# Patient Record
Sex: MALE | Race: WHITE | Employment: OTHER | URBAN - METROPOLITAN AREA
[De-identification: names, ages, dates, MRNs, and addresses within clinical notes are randomized per-mention and may not be internally consistent; named-entity substitution may affect disease eponyms.]

---

## 2019-12-23 ENCOUNTER — HOSPITAL ENCOUNTER (EMERGENCY)
Age: 49
Discharge: HOME | End: 2019-12-23
Attending: FAMILY MEDICINE
Payer: MEDICAID

## 2019-12-23 VITALS
HEIGHT: 68 IN | TEMPERATURE: 98 F | DIASTOLIC BLOOD PRESSURE: 87 MMHG | RESPIRATION RATE: 16 BRPM | WEIGHT: 179 LBS | HEART RATE: 67 BPM | OXYGEN SATURATION: 97 % | SYSTOLIC BLOOD PRESSURE: 125 MMHG | BODY MASS INDEX: 27.13 KG/M2

## 2019-12-23 DIAGNOSIS — J40 BRONCHITIS: Primary | ICD-10-CM

## 2019-12-23 PROCEDURE — G0381 LEV 2 HOSP TYPE B ED VISIT: HCPCS

## 2019-12-23 RX ORDER — DOXYCYCLINE HYCLATE 100 MG
100 TABLET ORAL 2 TIMES DAILY
Qty: 14 TABLET | Refills: 0 | Status: SHIPPED | OUTPATIENT
Start: 2019-12-23 | End: 2019-12-30

## 2019-12-23 RX ORDER — LEVOTHYROXINE SODIUM 112 UG/1
112 TABLET ORAL DAILY
COMMUNITY
End: 2021-03-22 | Stop reason: SDUPTHER

## 2019-12-23 RX ORDER — ALBUTEROL SULFATE 90 UG/1
2 AEROSOL, METERED RESPIRATORY (INHALATION) EVERY 6 HOURS PRN
COMMUNITY

## 2019-12-23 RX ORDER — MONTELUKAST SODIUM 10 MG/1
10 TABLET ORAL NIGHTLY
COMMUNITY
End: 2021-11-02 | Stop reason: SDUPTHER

## 2019-12-23 RX ORDER — PREDNISONE 20 MG/1
40 TABLET ORAL DAILY
Qty: 10 TABLET | Refills: 0 | Status: SHIPPED | OUTPATIENT
Start: 2019-12-23 | End: 2019-12-28

## 2019-12-23 RX ORDER — BENZONATATE 100 MG/1
100 CAPSULE ORAL 3 TIMES DAILY PRN
Qty: 20 CAPSULE | Refills: 0 | Status: SHIPPED | OUTPATIENT
Start: 2019-12-23 | End: 2019-12-30

## 2020-02-01 ENCOUNTER — HOSPITAL ENCOUNTER (EMERGENCY)
Age: 50
Discharge: HOME | End: 2020-02-01
Attending: FAMILY MEDICINE
Payer: MEDICAID

## 2020-02-01 VITALS
WEIGHT: 179 LBS | HEART RATE: 80 BPM | DIASTOLIC BLOOD PRESSURE: 70 MMHG | TEMPERATURE: 98.2 F | OXYGEN SATURATION: 97 % | BODY MASS INDEX: 27.13 KG/M2 | SYSTOLIC BLOOD PRESSURE: 127 MMHG | HEIGHT: 68 IN | RESPIRATION RATE: 18 BRPM

## 2020-02-01 PROCEDURE — G0381 LEV 2 HOSP TYPE B ED VISIT: HCPCS

## 2020-02-01 RX ORDER — BENZONATATE 100 MG/1
100 CAPSULE ORAL 2 TIMES DAILY PRN
Qty: 30 CAPSULE | Refills: 0 | Status: SHIPPED | OUTPATIENT
Start: 2020-02-01 | End: 2020-02-16

## 2020-02-01 RX ORDER — PREDNISONE 10 MG/1
50 TABLET ORAL DAILY
COMMUNITY
End: 2020-03-04

## 2020-02-01 RX ORDER — BUSPIRONE HYDROCHLORIDE 5 MG/1
5 TABLET ORAL 2 TIMES DAILY
COMMUNITY
End: 2020-12-15

## 2020-02-01 NOTE — ED PROVIDER NOTES
---------------------------------    IMPRESSION  1.  Viral URI        DISPOSITION  Disposition: Discharge to home  Patient condition is stable              Hunter Pro MD  02/01/20 [id number]

## 2020-03-04 ENCOUNTER — HOSPITAL ENCOUNTER (EMERGENCY)
Age: 50
Discharge: HOME | End: 2020-03-04
Attending: EMERGENCY MEDICINE
Payer: MEDICAID

## 2020-03-04 VITALS
WEIGHT: 179 LBS | HEIGHT: 68 IN | BODY MASS INDEX: 27.13 KG/M2 | TEMPERATURE: 98.3 F | SYSTOLIC BLOOD PRESSURE: 138 MMHG | OXYGEN SATURATION: 97 % | RESPIRATION RATE: 16 BRPM | HEART RATE: 70 BPM | DIASTOLIC BLOOD PRESSURE: 66 MMHG

## 2020-03-04 LAB — STREP GRP A PCR: NEGATIVE

## 2020-03-04 PROCEDURE — G0382 LEV 3 HOSP TYPE B ED VISIT: HCPCS

## 2020-03-04 PROCEDURE — 87880 STREP A ASSAY W/OPTIC: CPT

## 2020-03-04 RX ORDER — FLUTICASONE PROPIONATE 220 UG/1
2 AEROSOL, METERED RESPIRATORY (INHALATION) 2 TIMES DAILY
COMMUNITY
End: 2020-12-15 | Stop reason: SDUPTHER

## 2020-03-04 RX ORDER — DOXYCYCLINE HYCLATE 100 MG
100 TABLET ORAL 2 TIMES DAILY
Qty: 20 TABLET | Refills: 0 | Status: SHIPPED | OUTPATIENT
Start: 2020-03-04 | End: 2020-03-14

## 2020-03-04 ASSESSMENT — ENCOUNTER SYMPTOMS
SORE THROAT: 0
COUGH: 0
EYE REDNESS: 0
SHORTNESS OF BREATH: 0
NAUSEA: 0
VOMITING: 0
SINUS PRESSURE: 0
DIARRHEA: 0
ABDOMINAL PAIN: 0
EYE DISCHARGE: 0
WHEEZING: 0
BACK PAIN: 0
EYE PAIN: 0

## 2020-03-04 NOTE — ED PROVIDER NOTES
The history is provided by the patient. Pharyngitis   Location:  Generalized  Quality:  Aching and sore  Severity:  Moderate  Onset quality:  Sudden  Duration:  1 day  Chronicity:  New  Associated symptoms: no abdominal pain, no adenopathy, no chest pain, no chills, no cough, no ear pain, no eye discharge, no fever, no headaches, no rash and no shortness of breath         Review of Systems   Constitutional: Negative for chills and fever. HENT: Negative for ear pain, sinus pressure and sore throat. Eyes: Negative for pain, discharge and redness. Respiratory: Negative for cough, shortness of breath and wheezing. Cardiovascular: Negative for chest pain. Gastrointestinal: Negative for abdominal pain, diarrhea, nausea and vomiting. Genitourinary: Negative for dysuria and frequency. Musculoskeletal: Negative for arthralgias and back pain. Skin: Negative for rash and wound. Neurological: Negative for weakness and headaches. Hematological: Negative for adenopathy. All other systems reviewed and are negative. Physical Exam  Vitals signs and nursing note reviewed. Constitutional:       Appearance: He is well-developed. HENT:      Head: Normocephalic and atraumatic. Jaw: No trismus. Right Ear: Hearing, tympanic membrane, ear canal and external ear normal.      Left Ear: Hearing, tympanic membrane, ear canal and external ear normal.      Nose: Nose normal.      Right Sinus: No maxillary sinus tenderness or frontal sinus tenderness. Left Sinus: No maxillary sinus tenderness or frontal sinus tenderness. Mouth/Throat:      Pharynx: Uvula midline. Oropharyngeal exudate and posterior oropharyngeal erythema present. No uvula swelling. Eyes:      General: Lids are normal.      Conjunctiva/sclera: Conjunctivae normal.      Pupils: Pupils are equal, round, and reactive to light. Neck:      Musculoskeletal: Normal range of motion and neck supple.    Cardiovascular:      Rate and Rhythm: Normal rate and regular rhythm. Heart sounds: Normal heart sounds. No murmur. Pulmonary:      Effort: Pulmonary effort is normal.      Breath sounds: Normal breath sounds. Abdominal:      General: Bowel sounds are normal.      Palpations: Abdomen is soft. Abdomen is not rigid. Tenderness: There is no abdominal tenderness. There is no guarding or rebound. Skin:     General: Skin is warm and dry. Findings: No abrasion or rash. Neurological:      Mental Status: He is alert and oriented to person, place, and time. GCS: GCS eye subscore is 4. GCS verbal subscore is 5. GCS motor subscore is 6. Cranial Nerves: No cranial nerve deficit. Sensory: No sensory deficit. Coordination: Coordination normal.      Gait: Gait normal.          Procedures     MDM          --------------------------------------------- PAST HISTORY ---------------------------------------------  Past Medical History:  has a past medical history of Asthma, COPD (chronic obstructive pulmonary disease) ([facility] [address] Emphysema lung ([facility] [address] and Umbilical hernia. Past Surgical History:  has a past surgical history that includes Appendectomy. Social History:  reports that he has been smoking. He has been smoking about 0.50 packs per day. He has never used smokeless tobacco. He reports current alcohol use. He reports that he does not use drugs. Family History: family history is not on file. The patients home medications have been reviewed.     Allergies: Erythromycin and Penicillins    -------------------------------------------------- RESULTS -------------------------------------------------  Labs:  Results for orders placed or performed during the hospital encounter of 03/04/20   Strep Screen Group A Throat   Result Value Ref Range    Strep Grp A PCR Negative Negative       Radiology:  No orders to display       ------------------------- NURSING NOTES AND VITALS REVIEWED

## 2020-10-08 ENCOUNTER — TELEPHONE (OUTPATIENT)
Dept: ADMINISTRATIVE | Age: 50
End: 2020-10-08

## 2020-10-08 NOTE — TELEPHONE ENCOUNTER
Pt is wanting to establish with you, you see his girlfriend, Dolly Roland (#[id number]). Please advise. Thank you in advance.

## 2020-12-15 ENCOUNTER — OFFICE VISIT (OUTPATIENT)
Dept: FAMILY MEDICINE CLINIC | Age: 50
End: 2020-12-15
Payer: MEDICAID

## 2020-12-15 VITALS
RESPIRATION RATE: 20 BRPM | DIASTOLIC BLOOD PRESSURE: 82 MMHG | BODY MASS INDEX: 25.76 KG/M2 | HEART RATE: 78 BPM | OXYGEN SATURATION: 97 % | SYSTOLIC BLOOD PRESSURE: 128 MMHG | HEIGHT: 68 IN | WEIGHT: 170 LBS

## 2020-12-15 PROCEDURE — G8926 SPIRO NO PERF OR DOC: HCPCS | Performed by: FAMILY MEDICINE

## 2020-12-15 PROCEDURE — 4004F PT TOBACCO SCREEN RCVD TLK: CPT | Performed by: FAMILY MEDICINE

## 2020-12-15 PROCEDURE — 99203 OFFICE O/P NEW LOW 30 MIN: CPT | Performed by: FAMILY MEDICINE

## 2020-12-15 PROCEDURE — G8427 DOCREV CUR MEDS BY ELIG CLIN: HCPCS | Performed by: FAMILY MEDICINE

## 2020-12-15 PROCEDURE — G8419 CALC BMI OUT NRM PARAM NOF/U: HCPCS | Performed by: FAMILY MEDICINE

## 2020-12-15 PROCEDURE — G8484 FLU IMMUNIZE NO ADMIN: HCPCS | Performed by: FAMILY MEDICINE

## 2020-12-15 PROCEDURE — 3023F SPIROM DOC REV: CPT | Performed by: FAMILY MEDICINE

## 2020-12-15 RX ORDER — FLUTICASONE PROPIONATE 110 UG/1
AEROSOL, METERED RESPIRATORY (INHALATION)
COMMUNITY
End: 2020-12-15

## 2020-12-15 RX ORDER — FLUTICASONE PROPIONATE 220 UG/1
2 AEROSOL, METERED RESPIRATORY (INHALATION) 2 TIMES DAILY
COMMUNITY
Start: 2020-02-07

## 2020-12-15 RX ORDER — PREDNISONE 10 MG/1
TABLET ORAL PRN
COMMUNITY
End: 2021-05-03 | Stop reason: SDUPTHER

## 2020-12-15 RX ORDER — ALBUTEROL SULFATE 2.5 MG/3ML
SOLUTION RESPIRATORY (INHALATION)
COMMUNITY
Start: 2020-03-31

## 2020-12-15 ASSESSMENT — PATIENT HEALTH QUESTIONNAIRE - PHQ9
SUM OF ALL RESPONSES TO PHQ QUESTIONS 1-9: 0
1. LITTLE INTEREST OR PLEASURE IN DOING THINGS: 0
SUM OF ALL RESPONSES TO PHQ QUESTIONS 1-9: 0
2. FEELING DOWN, DEPRESSED OR HOPELESS: 0
SUM OF ALL RESPONSES TO PHQ QUESTIONS 1-9: 0
SUM OF ALL RESPONSES TO PHQ9 QUESTIONS 1 & 2: 0

## 2020-12-15 ASSESSMENT — ENCOUNTER SYMPTOMS
SHORTNESS OF BREATH: 1
NAUSEA: 0
DIARRHEA: 0
VOMITING: 0

## 2020-12-15 NOTE — PATIENT INSTRUCTIONS
Patient Education        Umbilical Hernia: Care Instructions  Overview     An umbilical hernia is a bulge near the belly button, or navel. Intestines or other tissues may bulge through an opening or a weak spot in the stomach muscles. The hernia has a sac that may hold some intestine, fat, or fluid. Many umbilical hernias are caused by pressure near the belly button. Pressure may come from increased weight, repeated straining, or pregnancy. A very small hernia may not cause problems. But your doctor may recommend repairing the muscle. This helps you avoid the risk that the hernia might trap some of the tissues or intestine. This could be an emergency. Follow-up care is a key part of your treatment and safety. Be sure to make and go to all appointments, and call your doctor if you are having problems. It's also a good idea to know your test results and keep a list of the medicines you take. How can you care for yourself at home? · Watch for any signs that the hernia may be causing problems. Your belly may get bigger, and the skin over the hernia may look red. You may have pain or feel bulging or pressure from the hernia. Call your doctor right away if you see these signs. When should you call for help? Call your doctor now or seek immediate medical care if:    · You have new or worse belly pain.     · You vomit.     · You cannot pass stool or gas.     · You can't push the hernia back into place with gentle pressure when you are lying down.     · The area over the hernia turns red or becomes tender. Watch closely for changes in your health, and be sure to contact your doctor if you have any problems. Current as of: April 15, 2020               Content Version: 12.6  © 2006-2020 [employer]. Care instructions adapted under license by [facility] ([facility]).  If you have questions about a medical condition or this instruction, always ask your healthcare professional. [address] any warranty or liability for your use of this information.

## 2020-12-15 NOTE — PROGRESS NOTES
Patient is a new patient here to get established. Dr. Tianna KENNY Yulia(:  1970) is a 52 y.o. male, here for     Patient has hernia in umbilicus. Started in March after bad asthma attack. Patient has noticed worsening pain for past week. Has been doing more heavy lifting. Denies nausea/vomiting/bowel trouble. Pain comes and goes, but noticing it protrudes further out. Patient has history of asthma/COPD. Patient sees pulmonologist in [facility]. Patient taking Lavelle Ort, home oxygen, and rarely needs albuterol prn.  +smoker for many years--1 ppd. Patient has history of hypothyroidism. Was managed by PCP. No recent labs. Patient has had intermittent urinary frequency and insomnia when taking Prednisone. Patient takes it as needed if he gets inflammation in his throat. Review of Systems   Eyes: Positive for visual disturbance (blurry vision due to traumatic eye injury). Respiratory: Positive for shortness of breath. Cardiovascular: Negative for chest pain, palpitations and leg swelling. Gastrointestinal: Negative for diarrhea, nausea and vomiting. Genitourinary: Negative for difficulty urinating, dysuria and frequency. Skin: Positive for rash (tinea versicolor). Psychiatric/Behavioral: Negative for dysphoric mood. Prior to Visit Medications    Medication Sig Taking?  Authorizing Provider   predniSONE (DELTASONE) 10 MG tablet as needed  Yes Historical Provider, MD   albuterol (PROVENTIL) (2.5 MG/3ML) 0.083% nebulizer solution inhale contents of 1 vial ( 3 milliliters ) in nebulizer by mouth and INTO THE LUNGS every 6 hours if needed Yes Historical Provider, MD   B Complex Vitamins (VITAMIN B COMPLEX 100 IJ) as directed Yes Historical Provider, MD   fluticasone (FLOVENT HFA) 220 MCG/ACT inhaler Inhale 2 puffs into the lungs 2 times daily Yes Historical Provider, MD   mometasone-formoterol (DULERA) 200-5 MCG/ACT inhaler Inhale 2 puffs into the lungs every 12 hours Yes Historical Provider, MD   levothyroxine (SYNTHROID) 112 MCG tablet Take 112 mcg by mouth Daily Yes Historical Provider, MD   albuterol sulfate  (90 Base) MCG/ACT inhaler Inhale 2 puffs into the lungs every 6 hours as needed for Wheezing Yes Historical Provider, MD   montelukast (SINGULAIR) 10 MG tablet Take 10 mg by mouth nightly Yes Historical Provider, MD        Allergies   Allergen Reactions    Erythromycin Swelling    Penicillins Other (See Comments)       Past Medical History:   Diagnosis Date    Asthma     [facility] ([facility]     Emphysema lung ([facility]     Hypothyroidism     Umbilical hernia        Past Surgical History:   Procedure Laterality Date    APPENDECTOMY         Social History     Socioeconomic History    Marital status:      Spouse name: Not on file    Number of children: Not on file    Years of education: Not on file    Highest education level: Not on file   Occupational History    Not on file   Social Needs    Financial resource strain: Not on file    Food insecurity     Worry: Not on file     Inability: Not on file    Transportation needs     Medical: Not on file     Non-medical: Not on file   Tobacco Use    Smoking status: Current Every Day Smoker     Packs/day: 1.00    Smokeless tobacco: Former User   Substance and Sexual Activity    Alcohol use: Yes     Comment: rarely    Drug use: Never    Sexual activity: Not on file   Lifestyle    Physical activity     Days per week: Not on file     Minutes per session: Not on file    Stress: Not on file   Relationships    Social connections     Talks on phone: Not on file     Gets together: Not on file     Attends Jew service: Not on file     Active member of club or organization: Not on file     Attends meetings of clubs or organizations: Not on file     Relationship status: Not on file    Intimate partner violence     Fear of current or ex partner: Not on file     Emotionally abused: Not on file     Physically abused: Not on file     Forced sexual activity: Not on file   Other Topics Concern    Not on file   Social History Narrative    Not on file        Family History   Problem Relation Age of Onset    Hypothyroidism Mother     Cancer Father         Multiple myeloma    Heart Attack Maternal Grandmother     Heart Failure Maternal Grandmother     Hypothyroidism Sister     Cancer Maternal Grandfather         Leukemia    Heart Disease Maternal Grandfather     Heart Failure Paternal Grandmother     Heart Attack Paternal Grandfather        Vitals:    12/15/20 1521   BP: 128/82   Pulse: 78   Resp: 20   SpO2: 97%   Weight: 170 lb (77.1 kg)   Height: 5' 8\" (1.727 m)     Estimated body mass index is 25.85 kg/m² as calculated from the following:    Height as of this encounter: 5' 8\" (1.727 m). Weight as of this encounter: 170 lb (77.1 kg). Physical Exam  Vitals signs reviewed. Constitutional:       Appearance: He is well-developed. Cardiovascular:      Rate and Rhythm: Normal rate and regular rhythm. Heart sounds: Normal heart sounds. No murmur. No friction rub. Pulmonary:      Effort: Pulmonary effort is normal. No respiratory distress. Breath sounds: Normal breath sounds. No wheezing or rales. Abdominal:      General: Bowel sounds are normal. There is no distension. Palpations: Abdomen is soft. Tenderness: There is no abdominal tenderness. There is no guarding or rebound. Hernia: A hernia (small umbilical) is present. Skin:     General: Skin is warm and dry. Neurological:      Mental Status: He is alert and oriented to person, place, and time. ASSESSMENT/PLAN:  Chiquis Don was seen today for hernia. Diagnoses and all orders for this visit:    Umbilical hernia without obstruction and without gangrene  -     Comprehensive Metabolic Panel; Future  -     CBC;  Future  -     Demar Soares MD, General Surgery, [facility] hypothyroidism  -     Lipid Panel; Future  -     TSH without Reflex; Future    Asthma with COPD ([facility] [address]        -     Continue respiratory regimen        Return in about 3 months (around 3/15/2021) for thyroid, 30 minute slot please.     Diann Torres

## 2020-12-16 ENCOUNTER — INITIAL CONSULT (OUTPATIENT)
Dept: SURGERY | Age: 50
End: 2020-12-16
Payer: MEDICAID

## 2020-12-16 ENCOUNTER — TELEPHONE (OUTPATIENT)
Dept: SURGERY | Age: 50
End: 2020-12-16

## 2020-12-16 VITALS
DIASTOLIC BLOOD PRESSURE: 73 MMHG | HEART RATE: 81 BPM | BODY MASS INDEX: 25.76 KG/M2 | SYSTOLIC BLOOD PRESSURE: 128 MMHG | TEMPERATURE: 99 F | WEIGHT: 170 LBS | HEIGHT: 68 IN

## 2020-12-16 PROBLEM — K42.9 UMBILICAL HERNIA WITHOUT OBSTRUCTION AND WITHOUT GANGRENE: Status: ACTIVE | Noted: 2020-12-16

## 2020-12-16 PROCEDURE — G8427 DOCREV CUR MEDS BY ELIG CLIN: HCPCS | Performed by: SURGERY

## 2020-12-16 PROCEDURE — G8484 FLU IMMUNIZE NO ADMIN: HCPCS | Performed by: SURGERY

## 2020-12-16 PROCEDURE — G8419 CALC BMI OUT NRM PARAM NOF/U: HCPCS | Performed by: SURGERY

## 2020-12-16 PROCEDURE — 99204 OFFICE O/P NEW MOD 45 MIN: CPT | Performed by: SURGERY

## 2020-12-16 PROCEDURE — 4004F PT TOBACCO SCREEN RCVD TLK: CPT | Performed by: SURGERY

## 2020-12-16 NOTE — PROGRESS NOTES
General Surgery History and Physical  [facility]    Patient's Name/Date of Birth: Alvarez Velazquez / 1970    Date: December 16, 2020     Surgeon: Michi Singh M.D.    PCP: Alicia Mcfadden MD     Chief Complaint: Abdominal hernia    HPI:   Alvarez Velazquez is a 52 y.o. male who presents for evaluation of umbilical hernia. Timing is constant, radiation to midline, alleviated by none and started several months ago, Feb and severity is 7/10. he has history of multiple abdominal surgeries. he has no history of previous repairs of the hernia. Denies nausea, vomiting, fever, chills, SOB, chest pain, diarrheal constipation. No history of abnormal colonoscopy. They are a smoker, 1pack per day for 30 years    Patient Active Problem List   Diagnosis    Umbilical hernia without obstruction and without gangrene       Past Medical History:   Diagnosis Date    Asthma     [facility]ch[facility]) [facility] [address]     Emphysema lung ([facility] [address]     Hypothyroidism     Umbilical hernia        Past Surgical History:   Procedure Laterality Date    APPENDECTOMY         Allergies   Allergen Reactions    Erythromycin Swelling    Penicillins Other (See Comments)       The patient has a family history that is negative for severe cardiovascular or respiratory issues, negative for reaction to anesthesia. Time spent reviewing past medical, surgical, social and family history, vitals, nursing assessment and images. No changes from above documented history.     Social History     Socioeconomic History    Marital status:      Spouse name: Not on file    Number of children: Not on file    Years of education: Not on file    Highest education level: Not on file   Occupational History    Not on file   Social Needs    Financial resource strain: Not on file    Food insecurity     Worry: Not on file     Inability: Not on file    Transportation needs     Medical: Not on file Non-medical: Not on file   Tobacco Use    Smoking status: Current Every Day Smoker     Packs/day: 1.00    Smokeless tobacco: Former User   Substance and Sexual Activity    Alcohol use: Yes     Comment: rarely    Drug use: Never    Sexual activity: Not on file   Lifestyle    Physical activity     Days per week: Not on file     Minutes per session: Not on file    Stress: Not on file   Relationships    Social connections     Talks on phone: Not on file     Gets together: Not on file     Attends Jain service: Not on file     Active member of club or organization: Not on file     Attends meetings of clubs or organizations: Not on file     Relationship status: Not on file    Intimate partner violence     Fear of current or ex partner: Not on file     Emotionally abused: Not on file     Physically abused: Not on file     Forced sexual activity: Not on file   Other Topics Concern    Not on file   Social History Narrative    Not on file       A complete 10 system review was performed and are otherwise negative unless mentioned in the above HPI. Specific negatives are listed below but may not include all those reviewed.     General ROS: negative obtundation, AMS  ENT ROS: negative rhinorrhea, epistaxis  Allergy and Immunology ROS: negative itchy/watery eyes or nasal congestion  Hematological and Lymphatic ROS: negative spontaneous bleeding or bruising  Endocrine ROS: negative  lethargy, mood swings, palpitations or polydipsia/polyuria  Respiratory ROS: negative sputum changes, stridor, tachypnea or wheezing  Cardiovascular ROS: negative for - loss of consciousness, murmur or orthopnea  Gastrointestinal ROS: negative for - hematochezia or hematemesis  Genito-Urinary ROS: negative for -  genital discharge or hematuria  Musculoskeletal ROS: negative for - focal weakness, gangrene  Psych/Neuro ROS: negative for - visual or auditory hallucinations, suicidal ideation    Physical exam: /73   Pulse 81   Temp 99 °F (37.2 °C) (Oral)   Ht 5' 8\" (1.727 m)   Wt 170 lb (77.1 kg)   BMI 25.85 kg/m²   General appearance:  NAD, appears stated age  Head: NCAT, PERRLA, EOMI, red conjunctiva  Neck: supple, no masses, trachea midline  Lungs: Equal chest rise bilateral, no retractions, no wheezing  Heart: Reg rate  Abdomen: soft, reducible umbilical hernia, mild tender  Skin; warm and dry, no cyanosis  Gu: no cva tenderness  Extremities: atraumatic, no focal motor deficits, no open wounds  Psych: No tremor, visual hallucinations        Radiology: I reviewed relevant abdominal imaging from this admission and that available in the EMR    Assessment:  Alvarez Velazquez is a 52 y.o. male with incisional hernia  Patient Active Problem List   Diagnosis    Umbilical hernia without obstruction and without gangrene         Plan:  OR for Laparoscopic poss open umbilical hernia repair with mesh  Discussed the risk, benefits and alternatives of surgery including wound infections, bleeding, scar, seroma, mesh infection, mesh removal and migration and recurrent hernia formation and the risks of general anesthetic including MI, CVA, sudden death or reactions to anesthetic medications. The patient understands the risks and alternatives and the possibility of converting to an open procedure. All questions were answered to the patient's satisfaction and they freely signed the consent.          Michi Singh MD  11:41 AM  12/16/2020

## 2020-12-16 NOTE — TELEPHONE ENCOUNTER
Per Dr. Reanna Barber, patient is scheduled for Laparoscopic possible open umbilical hernia repair with mesh  at [facility] on 12/22/20. Surgery scheduling form faxed with confirmation, surgeon's calendar updated. Dr. Reanna Barber to enter orders. Follow up appointment scheduled.    Electronically signed by Fernando Belcher RN on 12/16/2020 at 11:52 AM

## 2020-12-17 ENCOUNTER — HOSPITAL ENCOUNTER (OUTPATIENT)
Age: 50
Discharge: HOME | End: 2020-12-19
Payer: MEDICAID

## 2020-12-17 DIAGNOSIS — E03.9 ACQUIRED HYPOTHYROIDISM: ICD-10-CM

## 2020-12-17 DIAGNOSIS — K42.9 UMBILICAL HERNIA WITHOUT OBSTRUCTION AND WITHOUT GANGRENE: ICD-10-CM

## 2020-12-17 LAB
ALBUMIN SERPL-MCNC: 4.4 G/DL (ref 3.5–5.2)
ALP BLD-CCNC: 79 U/L (ref 40–129)
ALT SERPL-CCNC: 18 U/L (ref 0–40)
ANION GAP SERPL CALCULATED.3IONS-SCNC: 14 MMOL/L (ref 7–16)
AST SERPL-CCNC: 15 U/L (ref 0–39)
BILIRUB SERPL-MCNC: 0.7 MG/DL (ref 0–1.2)
BUN BLDV-MCNC: 16 MG/DL (ref 6–20)
CALCIUM SERPL-MCNC: 9.8 MG/DL (ref 8.6–10.2)
CHLORIDE BLD-SCNC: 103 MMOL/L (ref 98–107)
CHOLESTEROL, TOTAL: 205 MG/DL (ref 0–199)
CO2: 26 MMOL/L (ref 22–29)
CREAT SERPL-MCNC: 1.1 MG/DL (ref 0.7–1.2)
GFR AFRICAN AMERICAN: >60
GFR NON-AFRICAN AMERICAN: >60 ML/MIN/1.73
GLUCOSE BLD-MCNC: 84 MG/DL (ref 74–99)
HCT VFR BLD CALC: 48.5 % (ref 37–54)
HDLC SERPL-MCNC: 38 MG/DL
HEMOGLOBIN: 15.8 G/DL (ref 12.5–16.5)
LDL CHOLESTEROL CALCULATED: 152 MG/DL (ref 0–99)
MCH RBC QN AUTO: 29.5 PG (ref 26–35)
MCHC RBC AUTO-ENTMCNC: 32.6 % (ref 32–34.5)
MCV RBC AUTO: 90.5 FL (ref 80–99.9)
PDW BLD-RTO: 13.3 FL (ref 11.5–15)
PLATELET # BLD: 255 E9/L (ref 130–450)
PMV BLD AUTO: 10.3 FL (ref 7–12)
POTASSIUM SERPL-SCNC: 5.1 MMOL/L (ref 3.5–5)
RBC # BLD: 5.36 E12/L (ref 3.8–5.8)
SODIUM BLD-SCNC: 143 MMOL/L (ref 132–146)
TOTAL PROTEIN: 7.3 G/DL (ref 6.4–8.3)
TRIGL SERPL-MCNC: 75 MG/DL (ref 0–149)
TSH SERPL DL<=0.05 MIU/L-ACNC: 7.94 UIU/ML (ref 0.27–4.2)
VLDLC SERPL CALC-MCNC: 15 MG/DL
WBC # BLD: 8.8 E9/L (ref 4.5–11.5)

## 2020-12-17 PROCEDURE — U0003 INFECTIOUS AGENT DETECTION BY NUCLEIC ACID (DNA OR RNA); SEVERE ACUTE RESPIRATORY SYNDROME CORONAVIRUS 2 (SARS-COV-2) (CORONAVIRUS DISEASE [COVID-19]), AMPLIFIED PROBE TECHNIQUE, MAKING USE OF HIGH THROUGHPUT TECHNOLOGIES AS DESCRIBED BY CMS-2020-01-R: HCPCS

## 2020-12-18 PROBLEM — E03.9 ACQUIRED HYPOTHYROIDISM: Status: ACTIVE | Noted: 2020-12-18

## 2020-12-18 PROBLEM — J44.9 ASTHMA WITH COPD (HCC): Status: ACTIVE | Noted: 2020-12-18

## 2020-12-18 PROBLEM — J44.89 ASTHMA WITH COPD: Status: ACTIVE | Noted: 2020-12-18

## 2020-12-19 LAB
SARS-COV-2: NOT DETECTED
SOURCE: NORMAL

## 2020-12-21 NOTE — PROGRESS NOTES
[address] [facility]                                                                                                                    PRE OP INSTRUCTIONS FOR  [place]        Date: 12/21/2020    Date of surgery:  12/22/2020   Arrival Time: Hospital will call you between 5pm and 7pm with your final arrival time for surgery    1. Do not eat or drink anything after midnight   prior to surgery. This includes no water, chewing gum, mints or ice chips. 2. Take the following medications with a small sip of water on the morning of Surgery:  Use inhalers nebulizer  and bring  Take  thyroid dos with sip water    3. Diabetics may take evening dose of insulin but none after midnight. If you feel symptomatic or low blood sugar morning of surgery drink 1-2 ounces of apple juice only. 4. Aspirin, Ibuprofen, Advil, Naproxen, Vitamin E and other Anti-inflammatory products should be stopped  before surgery  as directed by your physician. Take Tylenol only unless instructed otherwise by your surgeon. 5. Check with your Doctor regarding stopping Plavix, Coumadin, Lovenox, Eliquis, Effient, or other blood thinners. 6. Do not smoke,use illicit drugs and do not drink any alcoholic beverages 24 hours prior to surgery. 7. You may brush your teeth the morning of surgery. DO NOT SWALLOW WATER    8. You MUST make arrangements for a responsible adult to take you home after your surgery. You will not be allowed to leave alone or drive yourself home. It is strongly suggested someone stay with you the first 24 hrs. Your surgery will be cancelled if you do not have a ride home. 9. PEDIATRIC PATIENTS ONLY:  A parent/legal guardian must accompany a child scheduled for surgery and plan to stay at the hospital until the child is discharged. Please do not bring other children with you.     10. Please wear simple, loose fitting clothing to the hospital.  Do not bring valuables (money, credit cards, checkbooks, etc.) Do not wear any makeup (including no eye makeup) or nail polish on your fingers or toes. 11. DO NOT wear any jewelry or piercings on day of surgery. All body piercing jewelry must be removed. 12. Shower the night before surgery with __x_Antibacterial soap /ANGI WIPES________    13. TOTAL JOINT REPLACEMENT/HYSTERECTOMY PATIENTS ONLY---Remember to bring Blood Bank bracelet to the hospital on the day of surgery. 14. If you have a Living Will and Durable Power of  for Healthcare, please bring in a copy. 15. If appropriate bring crutches, inspirex, WALKER, CANE etc... 12. Notify your Surgeon if you develop any illness between now and surgery time, cough, cold, fever, sore throat, nausea, vomiting, etc.  Please notify your surgeon if you experience dizziness, shortness of breath or blurred vision between now & the time of your surgery. 17. If you have ___dentures, they will be removed before going to the OR; we will provide you a container. If you wear ___contact lenses or ___glasses, they will be removed; please bring a case for them. 18. To provide excellent care visitors will be limited to 2 in the room at any given time. 19. Please bring picture ID and insurance card. 20. Sleep apnea patients need to bring CPAP AND SETTINGS to hospital on day of surgery. 21. During flu season no children under the age of 15 are permitted in the hospital for the safety of all patients. 22. Other                  Please call AMBULATORY CARE if you have any further questions.    [facility]     [address]

## 2020-12-22 ENCOUNTER — ANESTHESIA (OUTPATIENT)
Dept: OPERATING ROOM | Age: 50
End: 2020-12-22
Payer: MEDICAID

## 2020-12-22 ENCOUNTER — HOSPITAL ENCOUNTER (OUTPATIENT)
Dept: GENERAL RADIOLOGY | Age: 50
Setting detail: OUTPATIENT SURGERY
Discharge: HOME | End: 2020-12-24
Attending: SURGERY
Payer: MEDICAID

## 2020-12-22 ENCOUNTER — HOSPITAL ENCOUNTER (OUTPATIENT)
Age: 50
Setting detail: OUTPATIENT SURGERY
Discharge: HOME | End: 2020-12-22
Attending: SURGERY | Admitting: SURGERY
Payer: MEDICAID

## 2020-12-22 ENCOUNTER — ANESTHESIA EVENT (OUTPATIENT)
Dept: OPERATING ROOM | Age: 50
End: 2020-12-22
Payer: MEDICAID

## 2020-12-22 VITALS
OXYGEN SATURATION: 98 % | RESPIRATION RATE: 10 BRPM | SYSTOLIC BLOOD PRESSURE: 113 MMHG | DIASTOLIC BLOOD PRESSURE: 60 MMHG

## 2020-12-22 VITALS
WEIGHT: 170 LBS | HEART RATE: 63 BPM | BODY MASS INDEX: 25.76 KG/M2 | SYSTOLIC BLOOD PRESSURE: 119 MMHG | DIASTOLIC BLOOD PRESSURE: 75 MMHG | TEMPERATURE: 97.1 F | HEIGHT: 68 IN | OXYGEN SATURATION: 92 % | RESPIRATION RATE: 16 BRPM

## 2020-12-22 LAB — METER GLUCOSE: 106 MG/DL (ref 74–99)

## 2020-12-22 PROCEDURE — 7100000010 HC PHASE II RECOVERY - FIRST 15 MIN: Performed by: SURGERY

## 2020-12-22 PROCEDURE — 71046 X-RAY EXAM CHEST 2 VIEWS: CPT

## 2020-12-22 PROCEDURE — 88302 TISSUE EXAM BY PATHOLOGIST: CPT

## 2020-12-22 PROCEDURE — 49585 REPAIR UMBILICAL HERN,5+Y/O,REDUC: CPT | Performed by: SURGERY

## 2020-12-22 PROCEDURE — 2709999900 HC NON-CHARGEABLE SUPPLY: Performed by: SURGERY

## 2020-12-22 PROCEDURE — 6360000002 HC RX W HCPCS: Performed by: ANESTHESIOLOGY

## 2020-12-22 PROCEDURE — 2500000003 HC RX 250 WO HCPCS: Performed by: NURSE ANESTHETIST, CERTIFIED REGISTERED

## 2020-12-22 PROCEDURE — 7100000001 HC PACU RECOVERY - ADDTL 15 MIN: Performed by: SURGERY

## 2020-12-22 PROCEDURE — 2580000003 HC RX 258: Performed by: SURGERY

## 2020-12-22 PROCEDURE — 3700000001 HC ADD 15 MINUTES (ANESTHESIA): Performed by: SURGERY

## 2020-12-22 PROCEDURE — 7100000011 HC PHASE II RECOVERY - ADDTL 15 MIN: Performed by: SURGERY

## 2020-12-22 PROCEDURE — 3700000000 HC ANESTHESIA ATTENDED CARE: Performed by: SURGERY

## 2020-12-22 PROCEDURE — 6360000002 HC RX W HCPCS: Performed by: NURSE ANESTHETIST, CERTIFIED REGISTERED

## 2020-12-22 PROCEDURE — 2500000003 HC RX 250 WO HCPCS: Performed by: SURGERY

## 2020-12-22 PROCEDURE — 82962 GLUCOSE BLOOD TEST: CPT

## 2020-12-22 PROCEDURE — 7100000000 HC PACU RECOVERY - FIRST 15 MIN: Performed by: SURGERY

## 2020-12-22 PROCEDURE — 6360000002 HC RX W HCPCS: Performed by: SURGERY

## 2020-12-22 PROCEDURE — 3600000004 HC SURGERY LEVEL 4 BASE: Performed by: SURGERY

## 2020-12-22 PROCEDURE — 3600000014 HC SURGERY LEVEL 4 ADDTL 15MIN: Performed by: SURGERY

## 2020-12-22 RX ORDER — IBUPROFEN 800 MG/1
800 TABLET ORAL EVERY 6 HOURS PRN
Qty: 90 TABLET | Refills: 1 | Status: SHIPPED | OUTPATIENT
Start: 2020-12-22 | End: 2021-02-01

## 2020-12-22 RX ORDER — SODIUM CHLORIDE 0.9 % (FLUSH) 0.9 %
10 SYRINGE (ML) INJECTION PRN
Status: DISCONTINUED | OUTPATIENT
Start: 2020-12-22 | End: 2020-12-22 | Stop reason: HOSPADM

## 2020-12-22 RX ORDER — ONDANSETRON 4 MG/1
4 TABLET, ORALLY DISINTEGRATING ORAL EVERY 8 HOURS PRN
Qty: 20 TABLET | Refills: 1 | Status: SHIPPED | OUTPATIENT
Start: 2020-12-22 | End: 2021-09-21 | Stop reason: ALTCHOICE

## 2020-12-22 RX ORDER — MIDAZOLAM HYDROCHLORIDE 1 MG/ML
INJECTION INTRAMUSCULAR; INTRAVENOUS PRN
Status: DISCONTINUED | OUTPATIENT
Start: 2020-12-22 | End: 2020-12-22 | Stop reason: SDUPTHER

## 2020-12-22 RX ORDER — ROCURONIUM BROMIDE 10 MG/ML
INJECTION, SOLUTION INTRAVENOUS PRN
Status: DISCONTINUED | OUTPATIENT
Start: 2020-12-22 | End: 2020-12-22 | Stop reason: SDUPTHER

## 2020-12-22 RX ORDER — CEFAZOLIN SODIUM 2 G/50ML
SOLUTION INTRAVENOUS
Status: COMPLETED
Start: 2020-12-22 | End: 2020-12-22

## 2020-12-22 RX ORDER — CEFAZOLIN SODIUM 2 G/50ML
2 SOLUTION INTRAVENOUS
Status: COMPLETED | OUTPATIENT
Start: 2020-12-22 | End: 2020-12-22

## 2020-12-22 RX ORDER — ONDANSETRON 2 MG/ML
INJECTION INTRAMUSCULAR; INTRAVENOUS PRN
Status: DISCONTINUED | OUTPATIENT
Start: 2020-12-22 | End: 2020-12-22 | Stop reason: SDUPTHER

## 2020-12-22 RX ORDER — OXYCODONE HYDROCHLORIDE AND ACETAMINOPHEN 5; 325 MG/1; MG/1
1 TABLET ORAL EVERY 6 HOURS PRN
Qty: 18 TABLET | Refills: 0 | Status: SHIPPED | OUTPATIENT
Start: 2020-12-22 | End: 2020-12-27

## 2020-12-22 RX ORDER — NEOSTIGMINE METHYLSULFATE 1 MG/ML
INJECTION, SOLUTION INTRAVENOUS PRN
Status: DISCONTINUED | OUTPATIENT
Start: 2020-12-22 | End: 2020-12-22 | Stop reason: SDUPTHER

## 2020-12-22 RX ORDER — HYDRALAZINE HYDROCHLORIDE 20 MG/ML
5 INJECTION INTRAMUSCULAR; INTRAVENOUS EVERY 10 MIN PRN
Status: DISCONTINUED | OUTPATIENT
Start: 2020-12-22 | End: 2020-12-22 | Stop reason: HOSPADM

## 2020-12-22 RX ORDER — DEXAMETHASONE SODIUM PHOSPHATE 10 MG/ML
INJECTION, SOLUTION INTRAMUSCULAR; INTRAVENOUS PRN
Status: DISCONTINUED | OUTPATIENT
Start: 2020-12-22 | End: 2020-12-22 | Stop reason: SDUPTHER

## 2020-12-22 RX ORDER — LIDOCAINE HYDROCHLORIDE 20 MG/ML
INJECTION, SOLUTION INFILTRATION; PERINEURAL PRN
Status: DISCONTINUED | OUTPATIENT
Start: 2020-12-22 | End: 2020-12-22 | Stop reason: SDUPTHER

## 2020-12-22 RX ORDER — PROPOFOL 10 MG/ML
INJECTION, EMULSION INTRAVENOUS PRN
Status: DISCONTINUED | OUTPATIENT
Start: 2020-12-22 | End: 2020-12-22 | Stop reason: SDUPTHER

## 2020-12-22 RX ORDER — ONDANSETRON 2 MG/ML
4 INJECTION INTRAMUSCULAR; INTRAVENOUS
Status: DISCONTINUED | OUTPATIENT
Start: 2020-12-22 | End: 2020-12-22 | Stop reason: HOSPADM

## 2020-12-22 RX ORDER — PROMETHAZINE HYDROCHLORIDE 25 MG/ML
6.25 INJECTION, SOLUTION INTRAMUSCULAR; INTRAVENOUS
Status: DISCONTINUED | OUTPATIENT
Start: 2020-12-22 | End: 2020-12-22 | Stop reason: HOSPADM

## 2020-12-22 RX ORDER — SODIUM CHLORIDE 9 MG/ML
INJECTION, SOLUTION INTRAVENOUS CONTINUOUS
Status: DISCONTINUED | OUTPATIENT
Start: 2020-12-22 | End: 2020-12-22 | Stop reason: HOSPADM

## 2020-12-22 RX ORDER — SODIUM CHLORIDE, SODIUM LACTATE, POTASSIUM CHLORIDE, CALCIUM CHLORIDE 600; 310; 30; 20 MG/100ML; MG/100ML; MG/100ML; MG/100ML
INJECTION, SOLUTION INTRAVENOUS CONTINUOUS
Status: DISCONTINUED | OUTPATIENT
Start: 2020-12-22 | End: 2020-12-22 | Stop reason: HOSPADM

## 2020-12-22 RX ORDER — LABETALOL HYDROCHLORIDE 5 MG/ML
5 INJECTION, SOLUTION INTRAVENOUS EVERY 10 MIN PRN
Status: DISCONTINUED | OUTPATIENT
Start: 2020-12-22 | End: 2020-12-22 | Stop reason: HOSPADM

## 2020-12-22 RX ORDER — GLYCOPYRROLATE 1 MG/5 ML
SYRINGE (ML) INTRAVENOUS PRN
Status: DISCONTINUED | OUTPATIENT
Start: 2020-12-22 | End: 2020-12-22 | Stop reason: SDUPTHER

## 2020-12-22 RX ORDER — MEPERIDINE HYDROCHLORIDE 25 MG/ML
12.5 INJECTION INTRAMUSCULAR; INTRAVENOUS; SUBCUTANEOUS EVERY 5 MIN PRN
Status: DISCONTINUED | OUTPATIENT
Start: 2020-12-22 | End: 2020-12-22 | Stop reason: HOSPADM

## 2020-12-22 RX ORDER — BUPIVACAINE HYDROCHLORIDE AND EPINEPHRINE 2.5; 5 MG/ML; UG/ML
INJECTION, SOLUTION EPIDURAL; INFILTRATION; INTRACAUDAL; PERINEURAL PRN
Status: DISCONTINUED | OUTPATIENT
Start: 2020-12-22 | End: 2020-12-22 | Stop reason: ALTCHOICE

## 2020-12-22 RX ORDER — SODIUM CHLORIDE 0.9 % (FLUSH) 0.9 %
10 SYRINGE (ML) INJECTION EVERY 12 HOURS SCHEDULED
Status: DISCONTINUED | OUTPATIENT
Start: 2020-12-22 | End: 2020-12-22 | Stop reason: HOSPADM

## 2020-12-22 RX ORDER — DOCUSATE SODIUM 100 MG/1
100 CAPSULE, LIQUID FILLED ORAL 2 TIMES DAILY
Qty: 30 CAPSULE | Refills: 0 | Status: SHIPPED | OUTPATIENT
Start: 2020-12-22 | End: 2021-01-06

## 2020-12-22 RX ORDER — FENTANYL CITRATE 50 UG/ML
INJECTION, SOLUTION INTRAMUSCULAR; INTRAVENOUS PRN
Status: DISCONTINUED | OUTPATIENT
Start: 2020-12-22 | End: 2020-12-22 | Stop reason: SDUPTHER

## 2020-12-22 RX ADMIN — PROPOFOL 180 MG: 10 INJECTION, EMULSION INTRAVENOUS at 08:40

## 2020-12-22 RX ADMIN — LIDOCAINE HYDROCHLORIDE 80 MG: 20 INJECTION, SOLUTION INFILTRATION; PERINEURAL at 08:40

## 2020-12-22 RX ADMIN — Medication 3 MG: at 09:00

## 2020-12-22 RX ADMIN — HYDROCORTISONE SODIUM SUCCINATE 40 MG: 100 INJECTION, POWDER, FOR SOLUTION INTRAMUSCULAR; INTRAVENOUS at 08:07

## 2020-12-22 RX ADMIN — SODIUM CHLORIDE: 9 INJECTION, SOLUTION INTRAVENOUS at 10:06

## 2020-12-22 RX ADMIN — FENTANYL CITRATE 100 MCG: 50 INJECTION, SOLUTION INTRAMUSCULAR; INTRAVENOUS at 08:40

## 2020-12-22 RX ADMIN — ROCURONIUM BROMIDE 30 MG: 10 INJECTION, SOLUTION INTRAVENOUS at 08:40

## 2020-12-22 RX ADMIN — DEXAMETHASONE SODIUM PHOSPHATE 10 MG: 10 INJECTION, SOLUTION INTRAMUSCULAR; INTRAVENOUS at 08:44

## 2020-12-22 RX ADMIN — ONDANSETRON 4 MG: 2 INJECTION INTRAMUSCULAR; INTRAVENOUS at 08:48

## 2020-12-22 RX ADMIN — SODIUM CHLORIDE: 9 INJECTION, SOLUTION INTRAVENOUS at 08:00

## 2020-12-22 RX ADMIN — Medication 0.6 MG: at 09:00

## 2020-12-22 RX ADMIN — MIDAZOLAM 2 MG: 1 INJECTION INTRAMUSCULAR; INTRAVENOUS at 08:36

## 2020-12-22 RX ADMIN — SODIUM CHLORIDE: 9 INJECTION, SOLUTION INTRAVENOUS at 08:36

## 2020-12-22 RX ADMIN — CEFAZOLIN SODIUM 2 G: 2 SOLUTION INTRAVENOUS at 08:36

## 2020-12-22 ASSESSMENT — PULMONARY FUNCTION TESTS
PIF_VALUE: 0
PIF_VALUE: 0
PIF_VALUE: 21
PIF_VALUE: 24
PIF_VALUE: 2
PIF_VALUE: 1
PIF_VALUE: 2
PIF_VALUE: 20
PIF_VALUE: 24
PIF_VALUE: 21
PIF_VALUE: 19
PIF_VALUE: 24
PIF_VALUE: 15
PIF_VALUE: 20
PIF_VALUE: 23
PIF_VALUE: 17
PIF_VALUE: 32
PIF_VALUE: 24
PIF_VALUE: 21
PIF_VALUE: 24
PIF_VALUE: 19
PIF_VALUE: 41
PIF_VALUE: 21
PIF_VALUE: 21
PIF_VALUE: 23
PIF_VALUE: 5
PIF_VALUE: 3
PIF_VALUE: 6
PIF_VALUE: 0

## 2020-12-22 ASSESSMENT — PAIN SCALES - GENERAL
PAINLEVEL_OUTOF10: 1
PAINLEVEL_OUTOF10: 0
PAINLEVEL_OUTOF10: 2
PAINLEVEL_OUTOF10: 0

## 2020-12-22 ASSESSMENT — PAIN DESCRIPTION - DESCRIPTORS
DESCRIPTORS: ACHING
DESCRIPTORS: ACHING

## 2020-12-22 ASSESSMENT — PAIN DESCRIPTION - LOCATION
LOCATION: ABDOMEN
LOCATION: ABDOMEN

## 2020-12-22 ASSESSMENT — PAIN DESCRIPTION - PAIN TYPE
TYPE: ACUTE PAIN;SURGICAL PAIN
TYPE: ACUTE PAIN;SURGICAL PAIN

## 2020-12-22 ASSESSMENT — PAIN DESCRIPTION - ORIENTATION
ORIENTATION: MID
ORIENTATION: MID

## 2020-12-22 ASSESSMENT — PAIN - FUNCTIONAL ASSESSMENT: PAIN_FUNCTIONAL_ASSESSMENT: 0-10

## 2020-12-22 NOTE — H&P
History    Not on file   Social Needs    Financial resource strain: Not on file    Food insecurity     Worry: Not on file     Inability: Not on file    Transportation needs     Medical: Not on file     Non-medical: Not on file   Tobacco Use    Smoking status: Current Every Day Smoker     Packs/day: 1.00    Smokeless tobacco: Current User   Substance and Sexual Activity    Alcohol use: Yes     Comment: rarely    Drug use: Never    Sexual activity: Not on file   Lifestyle    Physical activity     Days per week: Not on file     Minutes per session: Not on file    Stress: Not on file   Relationships    Social connections     Talks on phone: Not on file     Gets together: Not on file     Attends Christian service: Not on file     Active member of club or organization: Not on file     Attends meetings of clubs or organizations: Not on file     Relationship status: Not on file    Intimate partner violence     Fear of current or ex partner: Not on file     Emotionally abused: Not on file     Physically abused: Not on file     Forced sexual activity: Not on file   Other Topics Concern    Not on file   Social History Narrative    Not on file       A complete 10 system review was performed and are otherwise negative unless mentioned in the above HPI. Specific negatives are listed below but may not include all those reviewed.     General ROS: negative obtundation, AMS  ENT ROS: negative rhinorrhea, epistaxis  Allergy and Immunology ROS: negative itchy/watery eyes or nasal congestion  Hematological and Lymphatic ROS: negative spontaneous bleeding or bruising  Endocrine ROS: negative  lethargy, mood swings, palpitations or polydipsia/polyuria  Respiratory ROS: negative sputum changes, stridor, tachypnea or wheezing  Cardiovascular ROS: negative for - loss of consciousness, murmur or orthopnea  Gastrointestinal ROS: negative for - hematochezia or hematemesis  Genito-Urinary ROS: negative for -  genital discharge or hematuria  Musculoskeletal ROS: negative for - focal weakness, gangrene  Psych/Neuro ROS: negative for - visual or auditory hallucinations, suicidal ideation    Physical exam:   /70   Pulse 62   Temp 97.7 °F (36.5 °C) (Infrared)   Resp 16   Ht 5' 8\" (1.727 m)   Wt 170 lb (77.1 kg)   SpO2 94%   BMI 25.85 kg/m²   General appearance:  NAD, appears stated age  Head: NCAT, PERRLA, EOMI, red conjunctiva  Neck: supple, no masses, trachea midline  Lungs: Equal chest rise bilateral, no retractions, no wheezing  Heart: Reg rate  Abdomen: soft, reducible umbilical hernia, mild tender  Skin; warm and dry, no cyanosis  Gu: no cva tenderness  Extremities: atraumatic, no focal motor deficits, no open wounds  Psych: No tremor, visual hallucinations        Radiology: I reviewed relevant abdominal imaging from this admission and that available in the EMR    Assessment:  Narayan Tierney is a 52 y.o. male with incisional hernia  Patient Active Problem List   Diagnosis    Umbilical hernia without obstruction and without gangrene    Asthma with COPD ([address]    Acquired hypothyroidism         Plan:  OR for Laparoscopic poss open umbilical hernia repair with mesh  Discussed the risk, benefits and alternatives of surgery including wound infections, bleeding, scar, seroma, mesh infection, mesh removal and migration and recurrent hernia formation and the risks of general anesthetic including MI, CVA, sudden death or reactions to anesthetic medications. The patient understands the risks and alternatives and the possibility of converting to an open procedure. All questions were answered to the patient's satisfaction and they freely signed the consent.          Yi Kelly MD  8:41 AM  12/22/2020

## 2020-12-22 NOTE — OP NOTE
Operative Note      Patient: Fior Randle  YOB: 1970  MRN: [id number]    Date of Procedure: 12/22/2020    Pre-Op Diagnosis: UMBILICAL HERNIA    Post-Op Diagnosis: Same       Procedure(s):  OPEN UMBILICAL HERNIA  REPAIR    Surgeon(s):  Rubio Yarbrough MD    Assistant:   First Assistant: Marija Liu    Anesthesia: General    Estimated Blood Loss (mL): 2cc    Complications: None    Specimens:   ID Type Source Tests Collected by Time Destination   A : hernia sac and contents Tissue Tissue SURGICAL PATHOLOGY Rubio Yarbrough MD 12/22/2020 5964        Implants:  * No implants in log *      Findings: small 1.5cm hernia    Detailed Description of Procedure:   Is a 57-year-old male with a history of increasing symptomatic umbilical hernia. Patient was seen and examined in the office and confirmed a reducible umbilical hernia that was very small probably less than 2 cm. We explained the risk benefits and alternatives procedure and agreed to proceed. Patient was then taken the operating place upon administered general anesthesia and intubated. Once patient was adequately sedated was prepped and draped in normal sterile fashion. Initially made a curvilinear incision inferior to the umbilicus using 11 blade scalpel. Use electrocautery dissect around the umbilical stalk surrounding it with a Chanell clamp. We released the umbilical stalk off the hernia sac itself. We then grasped the hernia sac and transected using cautery. Immediately could appreciate preperitoneal fat as well as a small medial amount of omentum within it. Defect itself measured 1.5 cm. Finger was inserted into the defect and there was no other adhesions appreciated. Due to the small size of the umbilical defect I elected to primarily repaired. #1 PDS suture was used to place a single interrupted figure-of-eight suture. Fascial edges were approximated without tension.   We then tacked the umbilical fascia back down to the anterior fascial sheath using 3-0 Vicryl suture. Then closed the skin using interrupted 3-0 Vicryl dermal stitches and surgical glue. Patient was then awoken extubated transferred the postoperative care in stable condition. All instrument counts lap counts and needle counts were correct at completion.     Electronically signed by Eleanor Perez MD on 12/22/2020 at 9:05 AM

## 2020-12-22 NOTE — ANESTHESIA PRE PROCEDURE
Department of Anesthesiology  Preprocedure Note       Name:  Lanre Brown   Age:  52 y.o.  :  1970                                          MRN:  [id number]         Date:  2020      Surgeon: Kylee Canales):  Nayely Kimbrough MD    Procedure: Procedure(s):  LAPAROSCOPIC, POSS OPEN UMBILICAL HERNIA REPAIR WITH MESH    Medications prior to admission:   Prior to Admission medications    Medication Sig Start Date End Date Taking?  Authorizing Provider   predniSONE (DELTASONE) 10 MG tablet as needed For asthma attack  Starts at 60 mg then decreasing dose  Last dose 2 weeks ago   Yes Historical Provider, MD   albuterol (PROVENTIL) (2.5 MG/3ML) 0.083% nebulizer solution inhale contents of 1 vial ( 3 milliliters ) in nebulizer by mouth and INTO THE LUNGS every 6 hours if needed 3/31/20  Yes Historical Provider, MD   B Complex Vitamins (VITAMIN B COMPLEX 100 IJ) 1 tablet daily    Yes Historical Provider, MD   fluticasone (FLOVENT HFA) 220 MCG/ACT inhaler Inhale 2 puffs into the lungs 2 times daily 20  Yes Historical Provider, MD   mometasone-formoterol (DULERA) 200-5 MCG/ACT inhaler Inhale 2 puffs into the lungs every 12 hours   Yes Historical Provider, MD   levothyroxine (SYNTHROID) 112 MCG tablet Take 112 mcg by mouth Daily   Yes Historical Provider, MD   albuterol sulfate  (90 Base) MCG/ACT inhaler Inhale 2 puffs into the lungs every 6 hours as needed for Wheezing   Yes Historical Provider, MD   montelukast (SINGULAIR) 10 MG tablet Take 10 mg by mouth nightly   Yes Historical Provider, MD       Current medications:    Current Facility-Administered Medications   Medication Dose Route Frequency Provider Last Rate Last Admin    ceFAZolin (ANCEF) 2-3 GM-%(50ML) IVPB (duplex)             lactated ringers infusion   Intravenous Continuous Anna Sherman MD        sodium chloride flush 0.9 % injection 10 mL  10 mL Intravenous PRN Anna Sherman MD  0.9 % sodium chloride infusion   Intravenous Continuous Carina Coronel  mL/hr at 12/22/20 0800 New Bag at 12/22/20 0800    sodium chloride flush 0.9 % injection 10 mL  10 mL Intravenous 2 times per day Carina Coronel MD        sodium chloride flush 0.9 % injection 10 mL  10 mL Intravenous PRN Carina Coronel MD        ceFAZolin (ANCEF) 2 g in dextrose 3 % 50 mL IVPB (duplex)  2 g Intravenous On Call to Tian Hester MD        hydrocortisone sodium succinate PF (SOLU-CORTEF) 100 MG injection                Allergies:     Allergies   Allergen Reactions    Erythromycin Swelling    Penicillins Other (See Comments)       Problem List:    Patient Active Problem List   Diagnosis Code    Umbilical hernia without obstruction and without gangrene K42.9    Asthma with COPD ([address] J44.9    Acquired hypothyroidism E03.9       Past Medical History:        Diagnosis Date    Asthma     COPD (chronic obstructive pulmonary disease) ([address]     Emphysema (subcutaneous) (surgical) resulting from a procedure     Emphysema lung ([address]     Hypothyroidism     Sleep apnea     cant wear cpap    Umbilical hernia        Past Surgical History:        Procedure Laterality Date    APPENDECTOMY      LEG SURGERY      i-d staph infection leg  years ago       Social History:    Social History     Tobacco Use    Smoking status: Current Every Day Smoker     Packs/day: 1.00    Smokeless tobacco: Current User   Substance Use Topics    Alcohol use: Yes     Comment: rarely                                Ready to quit: Not Answered  Counseling given: Not Answered      Vital Signs (Current):   Vitals:    12/21/20 0932 12/22/20 0751   BP:  108/70   Pulse:  62   Resp:  16   Temp:  97.7 °F (36.5 °C)   TempSrc:  Infrared   SpO2:  94%   Weight: 170 lb (77.1 kg)    Height: 5' 8\" (1.727 m)                                               BP Readings from Last 3 Encounters:   12/22/20 108/70   12/16/20 128/73   12/15/20 128/82 NPO Status: Time of last liquid consumption: 2100                        Time of last solid consumption: 2100                        Date of last liquid consumption: 12/21/20                        Date of last solid food consumption: 12/21/20    BMI:   Wt Readings from Last 3 Encounters:   12/21/20 170 lb (77.1 kg)   12/16/20 170 lb (77.1 kg)   12/15/20 170 lb (77.1 kg)     Body mass index is 25.85 kg/m². CBC:   Lab Results   Component Value Date    WBC 8.8 12/17/2020    RBC 5.36 12/17/2020    HGB 15.8 12/17/2020    HCT 48.5 12/17/2020    MCV 90.5 12/17/2020    RDW 13.3 12/17/2020     12/17/2020       CMP:   Lab Results   Component Value Date     12/17/2020    K 5.1 12/17/2020     12/17/2020    CO2 26 12/17/2020    BUN 16 12/17/2020    CREATININE 1.1 12/17/2020    GFRAA >60 12/17/2020    LABGLOM >60 12/17/2020    GLUCOSE 84 12/17/2020    PROT 7.3 12/17/2020    CALCIUM 9.8 12/17/2020    BILITOT 0.7 12/17/2020    ALKPHOS 79 12/17/2020    AST 15 12/17/2020    ALT 18 12/17/2020       POC Tests: No results for input(s): POCGLU, POCNA, POCK, POCCL, POCBUN, POCHEMO, POCHCT in the last 72 hours.     Coags: No results found for: PROTIME, INR, APTT    HCG (If Applicable): No results found for: PREGTESTUR, PREGSERUM, HCG, HCGQUANT     ABGs: No results found for: PHART, PO2ART, CJM5ICX, BCI4NGX, BEART, I1JWDKHR     Type & Screen (If Applicable):  No results found for: LABABO, LABRH    Drug/Infectious Status (If Applicable):  No results found for: HIV, HEPCAB    COVID-19 Screening (If Applicable):   Lab Results   Component Value Date    COVID19 Not Detected 12/17/2020         Anesthesia Evaluation  Patient summary reviewed and Nursing notes reviewed  Airway: Mallampati: III  TM distance: >3 FB   Neck ROM: full  Mouth opening: > = 3 FB Dental: normal exam         Pulmonary:normal exam  breath sounds clear to auscultation  (+) COPD:  sleep apnea: on noncompliant,  asthma: Cardiovascular:Negative CV ROS            Rhythm: regular  Rate: normal                    Neuro/Psych:   Negative Neuro/Psych ROS              GI/Hepatic/Renal: Neg GI/Hepatic/Renal ROS            Endo/Other:    (+) hypothyroidism::., .                 Abdominal:           Vascular: negative vascular ROS. Anesthesia Plan      general     ASA 3       Induction: intravenous. MIPS: Postoperative opioids intended and Prophylactic antiemetics administered. Anesthetic plan and risks discussed with patient. Use of blood products discussed with patient whom. Plan discussed with CRNA and attending.                   Lorin Wynn MD   12/22/2020

## 2020-12-22 NOTE — PROGRESS NOTES
Ancef given preop as ok'd by Dr Ananda Carson after speaking with pt.  No adverse reactions noted intraop

## 2020-12-22 NOTE — ANESTHESIA POSTPROCEDURE EVALUATION
Department of Anesthesiology  Postprocedure Note    Patient: Paula Prater  MRN: [id number]  YOB: 1970  Date of evaluation: 12/22/2020  Time:  10:04 AM     Procedure Summary     Date: 12/22/20 Room / Location: [address] [place] [address] / [address]    Anesthesia Start: 8203 Anesthesia Stop: 5129    Procedure: OPEN UMBILICAL HERNIA  REPAIR (N/A ) Diagnosis: (UMBILICAL HERNIA)    Surgeons: Alvarado Hernandez MD Responsible Provider: Anant Luque MD    Anesthesia Type: general ASA Status: 3          Anesthesia Type: general    Fanny Phase I: Fanny Score: 9    Fanny Phase II:      Last vitals: Reviewed and per EMR flowsheets.        Anesthesia Post Evaluation    Patient location during evaluation: PACU  Patient participation: complete - patient participated  Level of consciousness: awake and alert  Pain score: 0  Airway patency: patent  Nausea & Vomiting: no vomiting and no nausea  Complications: no  Cardiovascular status: hemodynamically stable  Respiratory status: acceptable  Hydration status: stable

## 2021-01-06 ENCOUNTER — OFFICE VISIT (OUTPATIENT)
Dept: SURGERY | Age: 51
End: 2021-01-06

## 2021-01-06 VITALS
HEART RATE: 61 BPM | HEIGHT: 68 IN | BODY MASS INDEX: 25.76 KG/M2 | RESPIRATION RATE: 16 BRPM | TEMPERATURE: 98.2 F | SYSTOLIC BLOOD PRESSURE: 122 MMHG | DIASTOLIC BLOOD PRESSURE: 76 MMHG | WEIGHT: 170 LBS

## 2021-01-06 DIAGNOSIS — K42.9 UMBILICAL HERNIA WITHOUT OBSTRUCTION AND WITHOUT GANGRENE: Primary | ICD-10-CM

## 2021-01-06 PROCEDURE — 99024 POSTOP FOLLOW-UP VISIT: CPT | Performed by: SURGERY

## 2021-01-06 NOTE — PROGRESS NOTES
General Surgery Office Note  Franko Walton MD, MS    Patient's Name/Date of Birth: Catherine Murphy / 1970    Date: January 6, 2021     Chief compaint: Postop visit from open umbilical hernia repair    Surgeon: Dominique Clark MD    Patient Active Problem List   Diagnosis    Umbilical hernia without obstruction and without gangrene    Asthma with COPD ([facility]    Acquired hypothyroidism       Subjective: Doing well, no new complaints, pain prior to surgery has resolved, tolerating diet, bowel function normal, anxious to return to normal physical activity    Objective:  /76   Pulse 61   Temp 98.2 °F (36.8 °C) (Temporal)   Resp 16   Ht 5' 8\" (1.727 m)   Wt 170 lb (77.1 kg)   BMI 25.85 kg/m²   Labs:  No results for input(s): WBC, HGB, HCT in the last 72 hours. Invalid input(s): PLR  Lab Results   Component Value Date    CREATININE 1.1 12/17/2020    BUN 16 12/17/2020     12/17/2020    K 5.1 (H) 12/17/2020     12/17/2020    CO2 26 12/17/2020     No results for input(s): LIPASE, AMYLASE in the last 72 hours.       Review of Systems -  General ROS: negative for - chills, fatigue or malaise  ENT ROS: negative for - hearing change, nasal congestion or nasal discharge  Allergy and Immunology ROS: negative for - hives, itchy/watery eyes or nasal congestion  Hematological and Lymphatic ROS: negative for - blood clots, blood transfusions, bruising or fatigue  Endocrine ROS: negative for - malaise/lethargy, mood swings, palpitations or polydipsia/polyuria  Respiratory ROS: negative for - sputum changes, stridor, tachypnea or wheezing  Cardiovascular ROS: negative for - irregular heartbeat, loss of consciousness, murmur or orthopnea  Gastrointestinal ROS: negative for - diarrhea, or hematemesis  Genito-Urinary ROS: negative for -  genital discharge, genital ulcers or hematuria  Musculoskeletal ROS: negative for - gait disturbance, muscle pain or muscular weakness Psych/Neuro ROS: negative for - visual or auditory hallucinations, suicidal ideation    General appearance: AA, NAD  HEENT: NCAT, PERRLA, EOMI  Lungs: Clear, equal rise bilateral  Heart: Reg  Abdomen: soft, nondistended, nontender, incisions well healed, no signs of infection, no cellulitis, no hematoma  Ext: Atraumatic, no swelling  : No hernia         Assessment/Plan:  Karine Vu is a 52 y.o. male 2 weeks s/p open umbilical hernia repair    Resume activity gradually   No heavy lifting more than 20lbs for 4 weeks total  Pathology reviewed and copy provided  Follow up as needed    Physician Signature: Electronically signed by Dr. July Howard  [phone] (p)  1/6/2021  10:29 AM

## 2021-02-01 RX ORDER — IBUPROFEN 800 MG/1
TABLET ORAL
Qty: 90 TABLET | Refills: 1 | Status: SHIPPED
Start: 2021-02-01 | End: 2021-03-22

## 2021-02-17 LAB — T4 FREE: 1.33 NG/DL (ref 0.93–1.7)

## 2021-03-22 ENCOUNTER — OFFICE VISIT (OUTPATIENT)
Dept: FAMILY MEDICINE CLINIC | Age: 51
End: 2021-03-22
Payer: MEDICAID

## 2021-03-22 VITALS
BODY MASS INDEX: 25.76 KG/M2 | WEIGHT: 170 LBS | SYSTOLIC BLOOD PRESSURE: 124 MMHG | DIASTOLIC BLOOD PRESSURE: 78 MMHG | RESPIRATION RATE: 20 BRPM | HEART RATE: 71 BPM | OXYGEN SATURATION: 99 % | HEIGHT: 68 IN

## 2021-03-22 DIAGNOSIS — E03.9 ACQUIRED HYPOTHYROIDISM: ICD-10-CM

## 2021-03-22 DIAGNOSIS — M54.32 SCIATICA OF LEFT SIDE: Primary | ICD-10-CM

## 2021-03-22 PROCEDURE — 4004F PT TOBACCO SCREEN RCVD TLK: CPT | Performed by: FAMILY MEDICINE

## 2021-03-22 PROCEDURE — G8484 FLU IMMUNIZE NO ADMIN: HCPCS | Performed by: FAMILY MEDICINE

## 2021-03-22 PROCEDURE — G8419 CALC BMI OUT NRM PARAM NOF/U: HCPCS | Performed by: FAMILY MEDICINE

## 2021-03-22 PROCEDURE — 99214 OFFICE O/P EST MOD 30 MIN: CPT | Performed by: FAMILY MEDICINE

## 2021-03-22 PROCEDURE — G8427 DOCREV CUR MEDS BY ELIG CLIN: HCPCS | Performed by: FAMILY MEDICINE

## 2021-03-22 RX ORDER — TIZANIDINE 4 MG/1
4 TABLET ORAL NIGHTLY PRN
Qty: 30 TABLET | Refills: 2 | Status: SHIPPED
Start: 2021-03-22 | End: 2021-06-14

## 2021-03-22 RX ORDER — LEVOTHYROXINE SODIUM 112 UG/1
112 TABLET ORAL DAILY
Qty: 30 TABLET | Refills: 5 | Status: SHIPPED
Start: 2021-03-22 | End: 2021-09-07

## 2021-03-22 ASSESSMENT — ENCOUNTER SYMPTOMS
BACK PAIN: 1
DIARRHEA: 0
WHEEZING: 1
VOMITING: 0
SHORTNESS OF BREATH: 1
NAUSEA: 0
ABDOMINAL PAIN: 0
COUGH: 1

## 2021-03-22 ASSESSMENT — PATIENT HEALTH QUESTIONNAIRE - PHQ9: SUM OF ALL RESPONSES TO PHQ QUESTIONS 1-9: 0

## 2021-03-22 NOTE — PROGRESS NOTES
[address] [facility], [address]   [address] [facility]   Anant Andrade MD     Patient: Rudy Vásquez Birth: 1970  Visit Date: 3/22/21    Jannette Recio is a 52y.o. year old male here today for   Chief Complaint   Patient presents with    Back Pain     hurt it recently       HPI  Back Pain  This is a new problem. The current episode started in the past 7 days. The pain is present in the lumbar spine. The pain is at a severity of 2/10. The pain is mild. The symptoms are aggravated by twisting. Associated symptoms include numbness (legs due to sciatica) and tingling (due to sciatica). Pertinent negatives include no abdominal pain, chest pain or dysuria. He has tried NSAIDs and heat for the symptoms. The treatment provided no relief. Patient sees a chiropractor. Has seen one off and on--does not know what is wrong in his back. Patient doing well on current regimen for hypothyroidism. Recent lab results reviewed with patient, including CMP, lipid panel, CBC and TSH which are remarkable for slightly elevated TSH. Review of Systems   Eyes: Negative for visual disturbance. Respiratory: Positive for cough (comes and goes), shortness of breath and wheezing (due to COPD). Cardiovascular: Negative for chest pain, palpitations and leg swelling. Gastrointestinal: Negative for abdominal pain, diarrhea, nausea and vomiting. Genitourinary: Negative for difficulty urinating, dysuria and frequency. Musculoskeletal: Positive for arthralgias, back pain and neck pain. Negative for joint swelling. Skin: Negative for rash. Neurological: Positive for tingling (due to sciatica) and numbness (legs due to sciatica). Psychiatric/Behavioral: Negative for dysphoric mood. Past medical, surgical, social and/or family historyreviewed, updated as needed, and are non-contributory (unless otherwise stated).   Medications, allergies, and problem list also reviewed and updated as needed in patient's record. Current Outpatient Medications   Medication Sig Dispense Refill    levothyroxine (SYNTHROID) 112 MCG tablet Take 1 tablet by mouth Daily 30 tablet 5    diclofenac (VOLTAREN) 50 MG EC tablet Take 1 tablet by mouth 3 times daily (with meals) 60 tablet 3    tiZANidine (ZANAFLEX) 4 MG tablet Take 1 tablet by mouth nightly as needed (muscle spasms) 30 tablet 2    ondansetron (ZOFRAN-ODT) 4 MG disintegrating tablet Take 1 tablet by mouth every 8 hours as needed for Nausea or Vomiting 20 tablet 1    predniSONE (DELTASONE) 10 MG tablet as needed For asthma attack  Starts at 60 mg then decreasing dose  Last dose 2 weeks ago      albuterol (PROVENTIL) (2.5 MG/3ML) 0.083% nebulizer solution inhale contents of 1 vial ( 3 milliliters ) in nebulizer by mouth and INTO THE LUNGS every 6 hours if needed      B Complex Vitamins (VITAMIN B COMPLEX 100 IJ) 1 tablet daily       fluticasone (FLOVENT HFA) 220 MCG/ACT inhaler Inhale 2 puffs into the lungs 2 times daily      mometasone-formoterol (DULERA) 200-5 MCG/ACT inhaler Inhale 2 puffs into the lungs every 12 hours      albuterol sulfate  (90 Base) MCG/ACT inhaler Inhale 2 puffs into the lungs every 6 hours as needed for Wheezing      montelukast (SINGULAIR) 10 MG tablet Take 10 mg by mouth nightly       No current facility-administered medications for this visit. Wt Readings from Last 3 Encounters:   03/22/21 170 lb (77.1 kg)   01/06/21 170 lb (77.1 kg)   12/21/20 170 lb (77.1 kg)                   Vitals:    03/22/21 0944   BP: 124/78   Pulse: 71   Resp: 20   SpO2: 99%        Physical Exam  Vitals signs reviewed. Constitutional:       Appearance: He is well-developed. Neck:      Musculoskeletal: Neck supple. Cardiovascular:      Rate and Rhythm: Normal rate and regular rhythm. Heart sounds: Normal heart sounds. No murmur. No friction rub.    Pulmonary:      Effort: Pulmonary effort is normal. No respiratory distress. Breath sounds: Normal breath sounds. No wheezing or rales. Abdominal:      General: Bowel sounds are normal. There is no distension. Palpations: Abdomen is soft. Tenderness: There is no abdominal tenderness. There is no guarding or rebound. Musculoskeletal:         General: Tenderness (lumbar paraspinal region left-sided) present. Skin:     General: Skin is warm and dry. Neurological:      Mental Status: He is alert and oriented to person, place, and time. Results for orders placed or performed in visit on 02/17/21   T4, Free   Result Value Ref Range    T4 Free 1.33 0.93 - 1.70 ng/dL       ASSESSMENT/PLAN  Florian Guido was seen today for back pain. Diagnoses and all orders for this visit:    Sciatica of left side  -     diclofenac (VOLTAREN) 50 MG EC tablet; Take 1 tablet by mouth 3 times daily (with meals)  -     XR LUMBAR SPINE (MIN 4 VIEWS); Future  -     tiZANidine (ZANAFLEX) 4 MG tablet; Take 1 tablet by mouth nightly as needed (muscle spasms)    Acquired hypothyroidism  -     levothyroxine (SYNTHROID) 112 MCG tablet; Take 1 tablet by mouth Daily            Phone/MyChart follow up if tests abnormal.    Return in about 6 weeks (around 5/3/2021) for back pain. or sooner if necessary. I have reviewed my findings and recommendations with Kaieli Hay.      Yuko Morin M.D

## 2021-03-22 NOTE — PATIENT INSTRUCTIONS
Patient Education        Learning About High Cholesterol  What is high cholesterol? High cholesterol means that you have too much cholesterol in your blood. Cholesterol is a type of fat. It's needed for many body functions, such as making new cells. Cholesterol is made by your body. It also comes from food you eat. Having high cholesterol can lead to the buildup of plaque in artery walls. This can increase your risk of heart disease and stroke. When your doctor talks about high cholesterol levels, he or she is talking about your total cholesterol and LDL cholesterol (the \"bad\" cholesterol) levels. Your doctor may also speak about HDL (the \"good\" cholesterol) levels. High HDL is linked with a lower risk for heart disease, heart attack, and stroke. Your cholesterol levels help your doctor find out your risk for having a heart attack or stroke. How can you prevent high cholesterol? A heart-healthy lifestyle can help you prevent high cholesterol. This lifestyle helps lower your risk for a heart attack and stroke. · Eat heart-healthy foods. ? Eat fruits, vegetables, whole grains (like oatmeal), dried beans and peas, nuts and seeds, soy products (like tofu), and fat-free or low-fat dairy products. ? Replace butter, margarine, and hydrogenated or partially hydrogenated oils with olive and canola oils. (Canola oil margarine without trans fat is fine.)  ? Replace red meat with fish, poultry, and soy protein (like tofu). ? Limit processed and packaged foods like chips, crackers, and cookies. · Be active. Exercise can improve your cholesterol level. Get at least 30 minutes of exercise on most days of the week. Walking is a good choice. You also may want to do other activities, such as running, swimming, cycling, or playing tennis or team sports. · Stay at a healthy weight. Lose weight if you need to. · Don't smoke. If you need help quitting, talk to your doctor about stop-smoking programs and medicines.  These 1. Stand comfortably with your feet shoulder-width apart. 2. Raise one arm over your head, and then lean to the other side. 3. Slide your hand down your leg as you let the weight of your arm gently stretch your side muscles. Hold for 15 to 30 seconds. 4. Repeat 2 to 4 times on each side. Press-up   1. Lie on your stomach, supporting your body with your forearms. 2. Press your elbows down into the floor to raise your upper back. As you do this, relax your stomach muscles and allow your back to arch without using your back muscles. As your press up, do not let your hips or pelvis come off the floor. 3. Hold for 15 to 30 seconds, then relax. 4. Repeat 2 to 4 times. Relax and rest   1. Lie on your back with a rolled towel under your neck and a pillow under your knees. Extend your arms comfortably to your sides. 2. Relax and breathe normally. 3. Remain in this position for about 10 minutes. 4. If you can, do this 2 or 3 times each day. Follow-up care is a key part of your treatment and safety. Be sure to make and go to all appointments, and call your doctor if you are having problems. It's also a good idea to know your test results and keep a list of the medicines you take. Where can you learn more? Go to [email or web address] and sign in to your [employer] account. Enter K619 in the [employer] box to learn more about \"Back Stretches: Exercises. \"     If you do not have an account, please click on the \"Sign Up Now\" link. Current as of: November 16, 2020               Content Version: 12.8  © 1374-9366 Healthwise, Incorporated. Care instructions adapted under license by [facility] [employer] [facility] ([facility]). If you have questions about a medical condition or this instruction, always ask your healthcare professional. [address] any warranty or liability for your use of this information.

## 2021-03-24 PROBLEM — M54.32 SCIATICA OF LEFT SIDE: Status: ACTIVE | Noted: 2021-03-24

## 2021-05-03 ENCOUNTER — OFFICE VISIT (OUTPATIENT)
Dept: FAMILY MEDICINE CLINIC | Age: 51
End: 2021-05-03
Payer: MEDICAID

## 2021-05-03 VITALS
BODY MASS INDEX: 26.98 KG/M2 | DIASTOLIC BLOOD PRESSURE: 72 MMHG | HEIGHT: 68 IN | HEART RATE: 80 BPM | RESPIRATION RATE: 20 BRPM | WEIGHT: 178 LBS | SYSTOLIC BLOOD PRESSURE: 124 MMHG | OXYGEN SATURATION: 97 %

## 2021-05-03 DIAGNOSIS — E03.9 ACQUIRED HYPOTHYROIDISM: ICD-10-CM

## 2021-05-03 DIAGNOSIS — M54.50 CHRONIC LOW BACK PAIN WITHOUT SCIATICA, UNSPECIFIED BACK PAIN LATERALITY: Primary | ICD-10-CM

## 2021-05-03 DIAGNOSIS — G89.29 CHRONIC LOW BACK PAIN WITHOUT SCIATICA, UNSPECIFIED BACK PAIN LATERALITY: Primary | ICD-10-CM

## 2021-05-03 DIAGNOSIS — K21.00 GASTROESOPHAGEAL REFLUX DISEASE WITH ESOPHAGITIS WITHOUT HEMORRHAGE: ICD-10-CM

## 2021-05-03 DIAGNOSIS — J44.9 ASTHMA WITH COPD (HCC): ICD-10-CM

## 2021-05-03 PROCEDURE — 99214 OFFICE O/P EST MOD 30 MIN: CPT | Performed by: FAMILY MEDICINE

## 2021-05-03 PROCEDURE — G8427 DOCREV CUR MEDS BY ELIG CLIN: HCPCS | Performed by: FAMILY MEDICINE

## 2021-05-03 PROCEDURE — G8926 SPIRO NO PERF OR DOC: HCPCS | Performed by: FAMILY MEDICINE

## 2021-05-03 PROCEDURE — 3023F SPIROM DOC REV: CPT | Performed by: FAMILY MEDICINE

## 2021-05-03 PROCEDURE — G8419 CALC BMI OUT NRM PARAM NOF/U: HCPCS | Performed by: FAMILY MEDICINE

## 2021-05-03 PROCEDURE — 4004F PT TOBACCO SCREEN RCVD TLK: CPT | Performed by: FAMILY MEDICINE

## 2021-05-03 RX ORDER — PREDNISONE 20 MG/1
40 TABLET ORAL DAILY
Qty: 10 TABLET | Refills: 3 | Status: SHIPPED
Start: 2021-05-03 | End: 2021-10-08

## 2021-05-03 RX ORDER — FAMOTIDINE 20 MG/1
20 TABLET, FILM COATED ORAL 2 TIMES DAILY PRN
Qty: 60 TABLET | Refills: 3 | Status: SHIPPED
Start: 2021-05-03 | End: 2021-08-25

## 2021-05-03 ASSESSMENT — ENCOUNTER SYMPTOMS
BOWEL INCONTINENCE: 0
VOMITING: 0
NAUSEA: 0
BACK PAIN: 1
SHORTNESS OF BREATH: 0
DIARRHEA: 0

## 2021-05-03 NOTE — PROGRESS NOTES
[address] [facility], [address]   [address] [facility]   Jason Benton MD     Patient: Heather Coulter Birth: 1970  Visit Date: 5/3/21    Jasmin Mccoy is a 54y.o. year old male here today for   Chief Complaint   Patient presents with    Back Pain     would like more predisone        HPI  Back Pain  This is a recurrent problem. The current episode started more than 1 month ago. The problem has been waxing and waning since onset. The pain is present in the lumbar spine. The quality of the pain is described as aching. The pain does not radiate. The pain is at a severity of 3/10. The pain is mild. The symptoms are aggravated by position. Pertinent negatives include no bladder incontinence, bowel incontinence, chest pain, dysuria or weakness. He has tried chiropractic manipulation for the symptoms. Patient doing well on current regimen for hypothyroidism. Recent lab results reviewed with patient, including CMP, CBC, TSH, and lipid panel which are remarkable for elevated cholesterol    Patient has noticed reflux if he eats or drinks late at night. Worse with drinking milk before bedtime. Patient doing well on current regimen for COPD. Sees pulmonologist.  Wants to have prednisone for prn flareup. Review of Systems   Eyes: Negative for visual disturbance. Respiratory: Negative for shortness of breath. Cardiovascular: Negative for chest pain, palpitations and leg swelling. Gastrointestinal: Negative for bowel incontinence, diarrhea, nausea and vomiting. Genitourinary: Negative for bladder incontinence, difficulty urinating, dysuria and frequency. Musculoskeletal: Positive for back pain. Skin: Negative for rash. Neurological: Negative for weakness. Psychiatric/Behavioral: Negative for dysphoric mood.        Past medical, surgical, social and/or family historyreviewed, updated as needed, and are non-contributory (unless otherwise Heart sounds: Normal heart sounds. No murmur. No friction rub. Pulmonary:      Effort: Pulmonary effort is normal. No respiratory distress. Breath sounds: Normal breath sounds. No wheezing or rales. Abdominal:      General: Bowel sounds are normal. There is no distension. Palpations: Abdomen is soft. Tenderness: There is no abdominal tenderness. There is no guarding or rebound. Musculoskeletal:         General: No tenderness. Comments: Strength 5/5 bilateral lower extremities; fair range of motion with lumbar flexion/extension and twisting   Skin:     General: Skin is warm and dry. Neurological:      Mental Status: He is alert and oriented to person, place, and time. Results for orders placed or performed in visit on 02/17/21   T4, Free   Result Value Ref Range    T4 Free 1.33 0.93 - 1.70 ng/dL       ASSESSMENT/PLAN  Kishore Dueñas was seen today for back pain. Diagnoses and all orders for this visit:    Chronic low back pain without sciatica, unspecified back pain laterality        -     Patient plans to follow up with chiropractor    Gastroesophageal reflux disease with esophagitis without hemorrhage  -     Start famotidine (PEPCID) 20 MG tablet; Take 1 tablet by mouth 2 times daily as needed (acid reflux)         -     Patient advised to avoid eating and drinking less than 2 hours before lying down    Asthma with COPD ([address]  -     predniSONE (DELTASONE) 20 MG tablet; Take 2 tablets by mouth daily For 5 days as needed for Asthma flare up    Acquired hypothyroidism        -     Synthroid 112 mcg daily for management of condition            Return in about 6 months (around 11/3/2021) for thyroid, 30 minute slot please. or sooner if necessary. I have reviewed my findings and recommendations with Kishore Dueñas.      Kerstin Closs, M.D

## 2021-05-03 NOTE — PATIENT INSTRUCTIONS
and sign in to your [employer] account. Enter P438 in the [employer] box to learn more about \"Back Stretches: Exercises. \"     If you do not have an account, please click on the \"Sign Up Now\" link. Current as of: November 16, 2020               Content Version: 12.8  © 9354-7561 Healthwise, Incorporated. Care instructions adapted under license by [facility] ([facility]). If you have questions about a medical condition or this instruction, always ask your healthcare professional. [address] any warranty or liability for your use of this information.

## 2021-08-02 ENCOUNTER — OFFICE VISIT (OUTPATIENT)
Dept: FAMILY MEDICINE CLINIC | Age: 51
End: 2021-08-02
Payer: MEDICAID

## 2021-08-02 ENCOUNTER — NURSE TRIAGE (OUTPATIENT)
Dept: OTHER | Facility: CLINIC | Age: 51
End: 2021-08-02

## 2021-08-02 VITALS
WEIGHT: 179 LBS | HEART RATE: 83 BPM | OXYGEN SATURATION: 98 % | RESPIRATION RATE: 20 BRPM | HEIGHT: 68 IN | BODY MASS INDEX: 27.13 KG/M2 | DIASTOLIC BLOOD PRESSURE: 74 MMHG | SYSTOLIC BLOOD PRESSURE: 136 MMHG

## 2021-08-02 DIAGNOSIS — M25.561 CHRONIC PAIN OF RIGHT KNEE: Primary | ICD-10-CM

## 2021-08-02 DIAGNOSIS — G89.29 CHRONIC PAIN OF RIGHT KNEE: Primary | ICD-10-CM

## 2021-08-02 PROCEDURE — G8419 CALC BMI OUT NRM PARAM NOF/U: HCPCS | Performed by: FAMILY MEDICINE

## 2021-08-02 PROCEDURE — G8427 DOCREV CUR MEDS BY ELIG CLIN: HCPCS | Performed by: FAMILY MEDICINE

## 2021-08-02 PROCEDURE — 99214 OFFICE O/P EST MOD 30 MIN: CPT | Performed by: FAMILY MEDICINE

## 2021-08-02 PROCEDURE — 4004F PT TOBACCO SCREEN RCVD TLK: CPT | Performed by: FAMILY MEDICINE

## 2021-08-02 SDOH — ECONOMIC STABILITY: FOOD INSECURITY: WITHIN THE PAST 12 MONTHS, THE FOOD YOU BOUGHT JUST DIDN'T LAST AND YOU DIDN'T HAVE MONEY TO GET MORE.: NEVER TRUE

## 2021-08-02 SDOH — ECONOMIC STABILITY: FOOD INSECURITY: WITHIN THE PAST 12 MONTHS, YOU WORRIED THAT YOUR FOOD WOULD RUN OUT BEFORE YOU GOT MONEY TO BUY MORE.: NEVER TRUE

## 2021-08-02 ASSESSMENT — ENCOUNTER SYMPTOMS
VOMITING: 0
NAUSEA: 0
SHORTNESS OF BREATH: 0
DIARRHEA: 0

## 2021-08-02 ASSESSMENT — LIFESTYLE VARIABLES: HOW OFTEN DO YOU HAVE A DRINK CONTAINING ALCOHOL: NEVER

## 2021-08-02 NOTE — PROGRESS NOTES
[address] [facility], [address]   [address] [facility]   Axel Smith MD     Patient: Phoenix London Birth: 1970  Visit Date: 8/2/21    Guera Dillon is a 54y.o. year old male here today for   Chief Complaint   Patient presents with    Knee Pain     right  x awhile        HPI  Knee Pain   Incident onset: Started a few  months ago. There was no injury mechanism. The pain is present in the right knee. Quality: Sharp pain. The pain is at a severity of 4/10. The pain is mild. The pain has been fluctuating since onset. Associated symptoms comments: +popping with active range of motion. The symptoms are aggravated by movement and weight bearing. He has tried acetaminophen and NSAIDs for the symptoms. The treatment provided mild relief. Patient went to chiropractor, and he did xrays. Was told the \"internal structures\" looked normal.      Recent lab results reviewed with patient, including CMP, CBC, TSH, and lipid panel    which are remarkable for elevated TSH, hyperlipidemia. Review of Systems   Respiratory: Negative for shortness of breath. Cardiovascular: Negative for chest pain, palpitations and leg swelling. Gastrointestinal: Negative for diarrhea, nausea and vomiting. Musculoskeletal: Positive for joint swelling (right knee). Past medical, surgical, social and/or family historyreviewed, updated as needed, and are non-contributory (unless otherwise stated). Medications, allergies, and problem list also reviewed and updated as needed in patient's record.      Current Outpatient Medications   Medication Sig Dispense Refill    tiZANidine (ZANAFLEX) 4 MG tablet take 1 tablet by mouth nightly if needed muscle spasm 30 tablet 3    predniSONE (DELTASONE) 20 MG tablet Take 2 tablets by mouth daily For 5 days as needed for Asthma flare up 10 tablet 3    famotidine (PEPCID) 20 MG tablet Take 1 tablet by mouth 2 times daily as needed (acid reflux) 60 tablet 3    levothyroxine (SYNTHROID) 112 MCG tablet Take 1 tablet by mouth Daily 30 tablet 5    diclofenac (VOLTAREN) 50 MG EC tablet Take 1 tablet by mouth 3 times daily (with meals) 60 tablet 3    ondansetron (ZOFRAN-ODT) 4 MG disintegrating tablet Take 1 tablet by mouth every 8 hours as needed for Nausea or Vomiting 20 tablet 1    albuterol (PROVENTIL) (2.5 MG/3ML) 0.083% nebulizer solution inhale contents of 1 vial ( 3 milliliters ) in nebulizer by mouth and INTO THE LUNGS every 6 hours if needed      B Complex Vitamins (VITAMIN B COMPLEX 100 IJ) 1 tablet daily       fluticasone (FLOVENT HFA) 220 MCG/ACT inhaler Inhale 2 puffs into the lungs 2 times daily      mometasone-formoterol (DULERA) 200-5 MCG/ACT inhaler Inhale 2 puffs into the lungs every 12 hours      albuterol sulfate  (90 Base) MCG/ACT inhaler Inhale 2 puffs into the lungs every 6 hours as needed for Wheezing      montelukast (SINGULAIR) 10 MG tablet Take 10 mg by mouth nightly       No current facility-administered medications for this visit. Wt Readings from Last 3 Encounters:   08/02/21 179 lb (81.2 kg)   05/03/21 178 lb (80.7 kg)   03/22/21 170 lb (77.1 kg)                   Vitals:    08/02/21 1607   BP: 136/74   Pulse: 83   Resp: 20   SpO2: 98%       Physical Exam  Vitals reviewed. Constitutional:       Appearance: He is well-developed. Cardiovascular:      Rate and Rhythm: Normal rate and regular rhythm. Heart sounds: Normal heart sounds. No murmur heard. No friction rub. Pulmonary:      Effort: Pulmonary effort is normal. No respiratory distress. Breath sounds: Normal breath sounds. No wheezing or rales. Abdominal:      General: Bowel sounds are normal. There is no distension. Palpations: Abdomen is soft. Tenderness: There is no abdominal tenderness. There is no guarding or rebound.    Musculoskeletal:         General: Swelling (Mild right anterior knee) and tenderness (Right anterior knee) present. Skin:     General: Skin is warm and dry. Neurological:      Mental Status: He is alert and oriented to person, place, and time. ASSESSMENT/PLAN  Donato Ibrahim was seen today for knee pain. Diagnoses and all orders for this visit:    Chronic pain of right knee  -     XR KNEE RIGHT (3 VIEWS); [facility]  -     [address] [place] [address] and Sports Medicine        -     Voltaren 50 mg p.o. 3 times daily for management        Return for scheduled appointment. or sooner if necessary. I have reviewed my findings and recommendations with Donato Patrice.      Jennifer Gruber MD, M.D

## 2021-08-02 NOTE — TELEPHONE ENCOUNTER
Reason for Disposition   MODERATE pain (e.g., symptoms interfere with work or school, limping) and present > 3 days    Answer Assessment - Initial Assessment Questions  1. LOCATION and RADIATION: \"Where is the pain located? \"       Knee right    2. QUALITY: \"What does the pain feel like? \"  (e.g., sharp, dull, aching, burning)      Sharp at times    3. SEVERITY: \"How bad is the pain? \" \"What does it keep you from doing? \"   (Scale 1-10; or mild, moderate, severe)    -  MILD (1-3): doesn't interfere with normal activities     -  MODERATE (4-7): interferes with normal activities (e.g., work or school) or awakens from sleep, limping     -  SEVERE (8-10): excruciating pain, unable to do any normal activities, unable to walk      Mild to mod    4. ONSET: \"When did the pain start? \" \"Does it come and go, or is it there all the time? \"      Couple weeks    5. RECURRENT: \"Have you had this pain before? \" If so, ask: \"When, and what happened then? \"      Denies    6. SETTING: \"Has there been any recent work, exercise or other activity that involved that part of the body?\"         7. AGGRAVATING FACTORS: \"What makes the knee pain worse? \" (e.g., walking, climbing stairs, running)      Movement and bending    8. ASSOCIATED SYMPTOMS: \"Is there any swelling or redness of the knee? \"      Swelling     9. OTHER SYMPTOMS: \"Do you have any other symptoms? \" (e.g., chest pain, difficulty breathing, fever, calf pain)      Denies    10. PREGNANCY: \"Is there any chance you are pregnant? \" \"When was your last menstrual period? \"        n/a    Protocols used: KNEE PAIN-ADULT-OH    Received call from Hannah Priest at [facility] with [employer].     Brief description of triage: as above c/o right knee pain for couple weeks with swelling no injury uses a cane to walk     Triage indicates for patient to be seen in 3 days    Care advice provided, patient verbalizes understanding; denies any other questions or concerns; instructed to call back for any new or worsening symptoms. Writer provided warm transfer to Marily Ortiz at [employer] for appointment scheduling. Attention Provider: Thank you for allowing me to participate in the care of your patient. The patient was connected to triage in response to information provided to the [facility]. Please do not respond through this encounter as the response is not directed to a shared pool.

## 2021-08-09 ENCOUNTER — OFFICE VISIT (OUTPATIENT)
Dept: ORTHOPEDIC SURGERY | Age: 51
End: 2021-08-09
Payer: MEDICAID

## 2021-08-09 VITALS — HEIGHT: 69 IN | BODY MASS INDEX: 25.92 KG/M2 | WEIGHT: 175 LBS | TEMPERATURE: 98 F

## 2021-08-09 DIAGNOSIS — M25.561 ACUTE PAIN OF RIGHT KNEE: Primary | ICD-10-CM

## 2021-08-09 DIAGNOSIS — M25.461 KNEE EFFUSION, RIGHT: ICD-10-CM

## 2021-08-09 PROCEDURE — 4004F PT TOBACCO SCREEN RCVD TLK: CPT | Performed by: ORTHOPAEDIC SURGERY

## 2021-08-09 PROCEDURE — G8427 DOCREV CUR MEDS BY ELIG CLIN: HCPCS | Performed by: ORTHOPAEDIC SURGERY

## 2021-08-09 PROCEDURE — G8419 CALC BMI OUT NRM PARAM NOF/U: HCPCS | Performed by: ORTHOPAEDIC SURGERY

## 2021-08-09 PROCEDURE — 99203 OFFICE O/P NEW LOW 30 MIN: CPT | Performed by: ORTHOPAEDIC SURGERY

## 2021-08-09 NOTE — PROGRESS NOTES
diclofenac (VOLTAREN) 50 MG EC tablet Take 1 tablet by mouth 3 times daily (with meals) 60 tablet 3    ondansetron (ZOFRAN-ODT) 4 MG disintegrating tablet Take 1 tablet by mouth every 8 hours as needed for Nausea or Vomiting 20 tablet 1    albuterol (PROVENTIL) (2.5 MG/3ML) 0.083% nebulizer solution inhale contents of 1 vial ( 3 milliliters ) in nebulizer by mouth and INTO THE LUNGS every 6 hours if needed      B Complex Vitamins (VITAMIN B COMPLEX 100 IJ) 1 tablet daily       fluticasone (FLOVENT HFA) 220 MCG/ACT inhaler Inhale 2 puffs into the lungs 2 times daily      mometasone-formoterol (DULERA) 200-5 MCG/ACT inhaler Inhale 2 puffs into the lungs every 12 hours      albuterol sulfate  (90 Base) MCG/ACT inhaler Inhale 2 puffs into the lungs every 6 hours as needed for Wheezing      montelukast (SINGULAIR) 10 MG tablet Take 10 mg by mouth nightly       No current facility-administered medications for this visit. Allergies   Allergen Reactions    Erythromycin Swelling    Penicillins Other (See Comments)       Social History     Socioeconomic History    Marital status:      Spouse name: None    Number of children: None    Years of education: None    Highest education level: None   Occupational History    None   Tobacco Use    Smoking status: Current Every Day Smoker     Packs/day: 1.00    Smokeless tobacco: Current User   Vaping Use    Vaping Use: Never used   Substance and Sexual Activity    Alcohol use: Yes     Comment: rarely    Drug use: Never    Sexual activity: None   Other Topics Concern    None   Social History Narrative    None     Social Determinants of Health     Financial Resource Strain:     Difficulty of Paying Living Expenses:    Food Insecurity: No Food Insecurity    Worried About Running Out of Food in the Last Year: Never true    Reagan of Food in the Last Year: Never true   Transportation Needs:     Lack of Transportation (Medical):      Lack of Transportation (Non-Medical):    Physical Activity:     Days of Exercise per Week:     Minutes of Exercise per Session:    Stress: No Stress Concern Present    Feeling of Stress : Only a little   Social Connections:     Frequency of Communication with Friends and Family:     Frequency of Social Gatherings with Friends and Family:     Attends Bahai Services:     Active Member of Clubs or Organizations:     Attends Club or Organization Meetings:     Marital Status:    Intimate Partner Violence:     Fear of Current or Ex-Partner:     Emotionally Abused:     Physically Abused:     Sexually Abused:        Family History   Problem Relation Age of Onset    Hypothyroidism Mother     Cancer Father         Multiple myeloma    Heart Attack Maternal Grandmother     Heart Failure Maternal Grandmother     Hypothyroidism Sister     Cancer Maternal Grandfather         Leukemia    Heart Disease Maternal Grandfather     Heart Failure Paternal Grandmother     Heart Attack Paternal Grandfather          Review of Systems:   As follows except as previously noted in HPI:  Constitutional: Negative for chills, diaphoresis,  fever   Respiratory: Negative for cough, shortness of breath and wheezing. Cardiovascular: Negative for chest pain and palpitations. Neurological: Negative for dizziness, syncope,   GI / : abdominal pain or cramping  Musculoskeletal: see HPI       Objective:   Physical Exam   Constitutional: Oriented to person, place, and time. and appears well-developed and well-nourished. :   Head: Normocephalic and atraumatic. Neck: Neck supple. Eyes: EOM are normal.   Pulmonary/Chest: Effort normal.  No respiratory distress, no wheezes. Neurological: Alert and oriented to person  Skin: Skin is warm and dry.                Sammie Barnes DO    8/9/21  2:46 PM    All reasonable efforts have been made to minimize the risk of errors that may occur in the use of voice recognition and other electronic means of charting.

## 2021-08-16 ENCOUNTER — HOSPITAL ENCOUNTER (OUTPATIENT)
Dept: MRI IMAGING | Age: 51
Discharge: HOME | End: 2021-08-18
Payer: MEDICAID

## 2021-08-16 DIAGNOSIS — M25.561 ACUTE PAIN OF RIGHT KNEE: ICD-10-CM

## 2021-08-16 PROCEDURE — 73721 MRI JNT OF LWR EXTRE W/O DYE: CPT

## 2021-08-18 ENCOUNTER — OFFICE VISIT (OUTPATIENT)
Dept: ORTHOPEDIC SURGERY | Age: 51
End: 2021-08-18
Payer: MEDICAID

## 2021-08-18 VITALS — BODY MASS INDEX: 25.92 KG/M2 | HEIGHT: 69 IN | TEMPERATURE: 98 F | WEIGHT: 175 LBS

## 2021-08-18 DIAGNOSIS — M23.321 DERANGEMENT OF POSTERIOR HORN OF MEDIAL MENISCUS, RIGHT: Primary | ICD-10-CM

## 2021-08-18 DIAGNOSIS — M94.261 CHONDROMALACIA, RIGHT KNEE: ICD-10-CM

## 2021-08-18 PROCEDURE — 4004F PT TOBACCO SCREEN RCVD TLK: CPT | Performed by: ORTHOPAEDIC SURGERY

## 2021-08-18 PROCEDURE — G8427 DOCREV CUR MEDS BY ELIG CLIN: HCPCS | Performed by: ORTHOPAEDIC SURGERY

## 2021-08-18 PROCEDURE — 99214 OFFICE O/P EST MOD 30 MIN: CPT | Performed by: ORTHOPAEDIC SURGERY

## 2021-08-18 PROCEDURE — G8419 CALC BMI OUT NRM PARAM NOF/U: HCPCS | Performed by: ORTHOPAEDIC SURGERY

## 2021-08-18 NOTE — PROGRESS NOTES
Chief Complaint:   Chief Complaint   Patient presents with    Knee Pain     Right knee MRI follow up       Sara Hernandez follows up for his right knee problems. He had his MRI done. He still has pain in the knee all the time. He is using a cane to get around now. Allergies; medications; past medical, surgical, family, and social history; and problem list have been reviewed today and updated as indicated in this encounter seen below. Exam: The patient is walking with a cane as an assist device. He is favoring the right lower extremity significantly when he walks. He is able to single-leg stand on the right side. Range of motion of the knee is 0 to 110 degrees or more. There is a slight amount of crepitus with range of motion. His collateral cruciate ligaments are grossly stable. There is no gapping of his joint with stress examination. Rios testing is equivocal today. The knee is not hot or red and there is minimal effusion today    Radiographs: Imaging by MRI 8/16/2021 shows abnormal signal in the medial meniscus of the right knee. There is also some chondral change in the patellofemoral articulation. There is a mild amount of fluid and a small ganglion cyst posterior which is to be of no consequence. ASSESSMENT:    Chelle Marcos was seen today for knee pain. Diagnoses and all orders for this visit:    Derangement of posterior horn of medial meniscus, right    Chondromalacia, right knee        PLAN: We discussed the pathology. The films were reviewed with the patient. Treatment options are conservative versus arthroscopic surgical to fully evaluate and treat the problem. He would like to go and get this done but after Labor Day. We discussed the surgery, risk, prognosis and limitations in detail with parent good understanding and will schedule accordingly. No follow-ups on file.        Current Outpatient Medications   Medication Sig Dispense Refill    tiZANidine (ZANAFLEX) 4 MG tablet take 1 tablet by mouth nightly if needed muscle spasm 30 tablet 3    predniSONE (DELTASONE) 20 MG tablet Take 2 tablets by mouth daily For 5 days as needed for Asthma flare up 10 tablet 3    famotidine (PEPCID) 20 MG tablet Take 1 tablet by mouth 2 times daily as needed (acid reflux) 60 tablet 3    levothyroxine (SYNTHROID) 112 MCG tablet Take 1 tablet by mouth Daily 30 tablet 5    diclofenac (VOLTAREN) 50 MG EC tablet Take 1 tablet by mouth 3 times daily (with meals) 60 tablet 3    ondansetron (ZOFRAN-ODT) 4 MG disintegrating tablet Take 1 tablet by mouth every 8 hours as needed for Nausea or Vomiting 20 tablet 1    albuterol (PROVENTIL) (2.5 MG/3ML) 0.083% nebulizer solution inhale contents of 1 vial ( 3 milliliters ) in nebulizer by mouth and INTO THE LUNGS every 6 hours if needed      B Complex Vitamins (VITAMIN B COMPLEX 100 IJ) 1 tablet daily       fluticasone (FLOVENT HFA) 220 MCG/ACT inhaler Inhale 2 puffs into the lungs 2 times daily      mometasone-formoterol (DULERA) 200-5 MCG/ACT inhaler Inhale 2 puffs into the lungs every 12 hours      albuterol sulfate  (90 Base) MCG/ACT inhaler Inhale 2 puffs into the lungs every 6 hours as needed for Wheezing      montelukast (SINGULAIR) 10 MG tablet Take 10 mg by mouth nightly       No current facility-administered medications for this visit.        Patient Active Problem List   Diagnosis    Umbilical hernia without obstruction and without gangrene    Asthma with COPD ([address]    Acquired hypothyroidism    Sciatica of left side    Gastroesophageal reflux disease with esophagitis without hemorrhage       Past Medical History:   Diagnosis Date    Asthma     COPD (chronic obstructive pulmonary disease) ([address]     Emphysema (subcutaneous) (surgical) resulting from a procedure     Emphysema lung ([address]     Hypothyroidism     Sleep apnea     cant wear cpap    Umbilical hernia        Past Surgical History:   Procedure Laterality Date    APPENDECTOMY      LEG SURGERY      i-d staph infection leg  years ago    UMBILICAL HERNIA REPAIR N/A 81/90/3757    OPEN UMBILICAL HERNIA  REPAIR performed by Aleksey Lopes MD at [address]   Allergen Reactions    Erythromycin Swelling    Penicillins Other (See Comments)       Social History     Socioeconomic History    Marital status:      Spouse name: None    Number of children: None    Years of education: None    Highest education level: None   Occupational History    None   Tobacco Use    Smoking status: Current Every Day Smoker     Packs/day: 1.00    Smokeless tobacco: Current User   Vaping Use    Vaping Use: Never used   Substance and Sexual Activity    Alcohol use: Yes     Comment: rarely    Drug use: Never    Sexual activity: None   Other Topics Concern    None   Social History Narrative    None     Social Determinants of Health     Financial Resource Strain:     Difficulty of Paying Living Expenses:    Food Insecurity: No Food Insecurity    Worried About Running Out of Food in the Last Year: Never true    Reagan of Food in the Last Year: Never true   Transportation Needs:     Lack of Transportation (Medical):  Lack of Transportation (Non-Medical):    Physical Activity:     Days of Exercise per Week:     Minutes of Exercise per Session:    Stress: No Stress Concern Present    Feeling of Stress :  Only a little   Social Connections:     Frequency of Communication with Friends and Family:     Frequency of Social Gatherings with Friends and Family:     Attends [employer] Services:     Active Member of Clubs or Organizations:     Attends Club or Organization Meetings:     Marital Status:    Intimate Partner Violence:     Fear of Current or Ex-Partner:     Emotionally Abused:     Physically Abused:     Sexually Abused:        Review of Systems  As follows except as previously noted in HPI:  Constitutional: Negative for chills, diaphoresis, fatigue, fever and unexpected weight change. Respiratory: Negative for cough, shortness of breath and wheezing. Cardiovascular: Negative for chest pain and palpitations. Neurological: Negative for dizziness, syncope, cephalgia. GI / : negative  Musculoskeletal: see HPI       Objective:   Physical Exam   Constitutional: Oriented to person, place, and time. and appears well-developed and well-nourished. :   Head: Normocephalic and atraumatic. Eyes: EOM are normal.   Neck: Neck supple. Cardiovascular: Normal rate and regular rhythm. Pulmonary/Chest: Effort normal. No stridor. No respiratory distress, no wheezes. Abdominal:  No abnormal distension. Neurological: Alert and oriented to person, place, and time. Skin: Skin is warm and dry. Psychiatric: Normal mood and affect.  Behavior is normal. Thought content normal.    JAMES Estrada DO    8/18/21  9:44 AM

## 2021-08-21 DIAGNOSIS — K21.00 GASTROESOPHAGEAL REFLUX DISEASE WITH ESOPHAGITIS WITHOUT HEMORRHAGE: ICD-10-CM

## 2021-08-25 RX ORDER — FAMOTIDINE 20 MG/1
TABLET, FILM COATED ORAL
Qty: 60 TABLET | Refills: 3 | Status: SHIPPED
Start: 2021-08-25 | End: 2021-12-21

## 2021-08-27 ENCOUNTER — NURSE ONLY (OUTPATIENT)
Dept: FAMILY MEDICINE CLINIC | Age: 51
End: 2021-08-27
Payer: MEDICAID

## 2021-08-27 DIAGNOSIS — Z01.818 PRE-OP TESTING: Primary | ICD-10-CM

## 2021-08-27 PROCEDURE — 93000 ELECTROCARDIOGRAM COMPLETE: CPT | Performed by: FAMILY MEDICINE

## 2021-09-04 DIAGNOSIS — E03.9 ACQUIRED HYPOTHYROIDISM: ICD-10-CM

## 2021-09-08 RX ORDER — LEVOTHYROXINE SODIUM 112 UG/1
TABLET ORAL
Qty: 30 TABLET | Refills: 2 | Status: SHIPPED | OUTPATIENT
Start: 2021-09-08 | End: 2021-11-02 | Stop reason: SDUPTHER

## 2021-09-21 DIAGNOSIS — Z13.9 SCREENING DUE: Primary | ICD-10-CM

## 2021-09-21 LAB
Lab: NORMAL
PERFORMING INSTRUMENT: NORMAL
QC PASS/FAIL: NORMAL
SARS-COV-2, POC: NORMAL

## 2021-09-21 PROCEDURE — 87426 SARSCOV CORONAVIRUS AG IA: CPT | Performed by: FAMILY MEDICINE

## 2021-09-22 ENCOUNTER — ANESTHESIA EVENT (OUTPATIENT)
Dept: OPERATING ROOM | Age: 51
End: 2021-09-22
Payer: MEDICAID

## 2021-09-22 ASSESSMENT — LIFESTYLE VARIABLES: SMOKING_STATUS: 1

## 2021-09-22 NOTE — ANESTHESIA PRE PROCEDURE
Department of Anesthesiology  Preprocedure Note       Name:  Dereck Linn   Age:  54 y.o.  :  1970                                          MRN:  [id number]         Date:  2021      Surgeon: Benita Shane):  JAMES Sawant DO    Procedure: Procedure(s):  ARTHROSCOPIC EXAM AND TREATMENT RIGHT KNEE    Medications prior to admission:   Prior to Admission medications    Medication Sig Start Date End Date Taking? Authorizing Provider   levothyroxine (SYNTHROID) 112 MCG tablet take 1 tablet by mouth once daily 21   Parminder Barcenas MD   famotidine (PEPCID) 20 MG tablet take 1 tablet by mouth twice a day if needed for ACID REFLUX 21   Parminder Barcenas MD   predniSONE (DELTASONE) 20 MG tablet Take 2 tablets by mouth daily For 5 days as needed for Asthma flare up 5/3/21   Parminder Barcenas MD   albuterol (PROVENTIL) (2.5 MG/3ML) 0.083% nebulizer solution inhale contents of 1 vial ( 3 milliliters ) in nebulizer by mouth and INTO THE LUNGS every 6 hours if needed 3/31/20   Historical Provider, MD   fluticasone (FLOVENT HFA) 220 MCG/ACT inhaler Inhale 2 puffs into the lungs 2 times daily 20   Historical Provider, MD   mometasone-formoterol (DULERA) 200-5 MCG/ACT inhaler Inhale 2 puffs into the lungs every 12 hours    Historical Provider, MD   albuterol sulfate  (90 Base) MCG/ACT inhaler Inhale 2 puffs into the lungs every 6 hours as needed for Wheezing    Historical Provider, MD   montelukast (SINGULAIR) 10 MG tablet Take 10 mg by mouth nightly    Historical Provider, MD       Current medications:    No current facility-administered medications for this encounter.      Current Outpatient Medications   Medication Sig Dispense Refill    levothyroxine (SYNTHROID) 112 MCG tablet take 1 tablet by mouth once daily 30 tablet 2    famotidine (PEPCID) 20 MG tablet take 1 tablet by mouth twice a day if needed for ACID REFLUX 60 tablet 3    predniSONE (DELTASONE) 20 MG tablet Take 2 tablets by mouth daily For 5 days as needed for Asthma flare up 10 tablet 3    albuterol (PROVENTIL) (2.5 MG/3ML) 0.083% nebulizer solution inhale contents of 1 vial ( 3 milliliters ) in nebulizer by mouth and INTO THE LUNGS every 6 hours if needed      fluticasone (FLOVENT HFA) 220 MCG/ACT inhaler Inhale 2 puffs into the lungs 2 times daily      mometasone-formoterol (DULERA) 200-5 MCG/ACT inhaler Inhale 2 puffs into the lungs every 12 hours      albuterol sulfate  (90 Base) MCG/ACT inhaler Inhale 2 puffs into the lungs every 6 hours as needed for Wheezing      montelukast (SINGULAIR) 10 MG tablet Take 10 mg by mouth nightly         Allergies:     Allergies   Allergen Reactions    Erythromycin Swelling    Penicillins Other (See Comments)       Problem List:    Patient Active Problem List   Diagnosis Code    Umbilical hernia without obstruction and without gangrene K42.9    Asthma with COPD ([address] J44.9    Acquired hypothyroidism E03.9    Sciatica of left side M54.32    Gastroesophageal reflux disease with esophagitis without hemorrhage K21.00       Past Medical History:        Diagnosis Date    Asthma     COPD (chronic obstructive pulmonary disease) (HCC)     Emphysema (subcutaneous) (surgical) resulting from a procedure     Emphysema lung ([address]     Hypothyroidism     Sleep apnea     cant wear cpap    Umbilical hernia        Past Surgical History:        Procedure Laterality Date    APPENDECTOMY      LEG SURGERY      i-d staph infection leg  years ago    UMBILICAL HERNIA REPAIR N/A 25/55/0343    OPEN UMBILICAL HERNIA  REPAIR performed by Terence Luis MD at [address] History:    Social History     Tobacco Use    Smoking status: Current Every Day Smoker     Packs/day: 1.00    Smokeless tobacco: Current User   Substance Use Topics    Alcohol use: Yes     Comment: rarely                                Ready to quit: Not Answered  Counseling given: Not Pulmonary: breath sounds clear to auscultation  (+) COPD:  sleep apnea: on noncompliant,  asthma: current smoker (1 ppd)          Patient smoked on day of surgery. ROS comment: Hx subcutaneous emphysema (iatrogenic)   Cardiovascular:  Exercise tolerance: good (>4 METS),           Rhythm: regular  Rate: normal                 ROS comment: EKG=sinus fabio    WITHIN NORMAL LIMITS      Cleared by PCP     Neuro/Psych:   (+) neuromuscular disease:,             GI/Hepatic/Renal:            ROS comment: Umbilical hernia. Endo/Other:    (+) hypothyroidism::., .                 Abdominal:         (-) obese       Vascular: Other Findings:           Anesthesia Plan      general     ASA 3     (PCP clearance on chart  Prefers GA over SAB)  Induction: intravenous. MIPS: Postoperative opioids intended and Prophylactic antiemetics administered. Anesthetic plan and risks discussed with patient. Plan discussed with CRNA.                 Daron Aleman MD   9/22/2021

## 2021-09-23 ENCOUNTER — HOSPITAL ENCOUNTER (OUTPATIENT)
Age: 51
Setting detail: OUTPATIENT SURGERY
Discharge: HOME | End: 2021-09-23
Attending: ORTHOPAEDIC SURGERY | Admitting: ORTHOPAEDIC SURGERY
Payer: MEDICAID

## 2021-09-23 ENCOUNTER — ANESTHESIA (OUTPATIENT)
Dept: OPERATING ROOM | Age: 51
End: 2021-09-23
Payer: MEDICAID

## 2021-09-23 VITALS
SYSTOLIC BLOOD PRESSURE: 128 MMHG | BODY MASS INDEX: 25.76 KG/M2 | RESPIRATION RATE: 16 BRPM | HEART RATE: 66 BPM | OXYGEN SATURATION: 96 % | DIASTOLIC BLOOD PRESSURE: 71 MMHG | TEMPERATURE: 97.5 F | WEIGHT: 170 LBS | HEIGHT: 68 IN

## 2021-09-23 VITALS
SYSTOLIC BLOOD PRESSURE: 108 MMHG | OXYGEN SATURATION: 99 % | DIASTOLIC BLOOD PRESSURE: 51 MMHG | RESPIRATION RATE: 16 BRPM

## 2021-09-23 DIAGNOSIS — M67.51 PLICA OF KNEE, RIGHT: ICD-10-CM

## 2021-09-23 DIAGNOSIS — M25.561 RIGHT KNEE PAIN, UNSPECIFIED CHRONICITY: Primary | ICD-10-CM

## 2021-09-23 DIAGNOSIS — S83.241A OTHER TEAR OF MEDIAL MENISCUS OF RIGHT KNEE AS CURRENT INJURY, INITIAL ENCOUNTER: ICD-10-CM

## 2021-09-23 DIAGNOSIS — M65.9 SYNOVITIS OF RIGHT KNEE: ICD-10-CM

## 2021-09-23 PROBLEM — M65.961 SYNOVITIS OF RIGHT KNEE: Chronic | Status: ACTIVE | Noted: 2021-09-23

## 2021-09-23 PROCEDURE — 7100000011 HC PHASE II RECOVERY - ADDTL 15 MIN: Performed by: ORTHOPAEDIC SURGERY

## 2021-09-23 PROCEDURE — 3700000000 HC ANESTHESIA ATTENDED CARE: Performed by: ORTHOPAEDIC SURGERY

## 2021-09-23 PROCEDURE — 6360000002 HC RX W HCPCS

## 2021-09-23 PROCEDURE — 2580000003 HC RX 258

## 2021-09-23 PROCEDURE — 2709999900 HC NON-CHARGEABLE SUPPLY: Performed by: ORTHOPAEDIC SURGERY

## 2021-09-23 PROCEDURE — 3600000003 HC SURGERY LEVEL 3 BASE: Performed by: ORTHOPAEDIC SURGERY

## 2021-09-23 PROCEDURE — 7100000001 HC PACU RECOVERY - ADDTL 15 MIN: Performed by: ORTHOPAEDIC SURGERY

## 2021-09-23 PROCEDURE — 2500000003 HC RX 250 WO HCPCS

## 2021-09-23 PROCEDURE — 6370000000 HC RX 637 (ALT 250 FOR IP): Performed by: ANESTHESIOLOGY

## 2021-09-23 PROCEDURE — 3600000013 HC SURGERY LEVEL 3 ADDTL 15MIN: Performed by: ORTHOPAEDIC SURGERY

## 2021-09-23 PROCEDURE — 2580000003 HC RX 258: Performed by: ANESTHESIOLOGY

## 2021-09-23 PROCEDURE — 7100000000 HC PACU RECOVERY - FIRST 15 MIN: Performed by: ORTHOPAEDIC SURGERY

## 2021-09-23 PROCEDURE — 7100000010 HC PHASE II RECOVERY - FIRST 15 MIN: Performed by: ORTHOPAEDIC SURGERY

## 2021-09-23 PROCEDURE — 2720000010 HC SURG SUPPLY STERILE: Performed by: ORTHOPAEDIC SURGERY

## 2021-09-23 PROCEDURE — 2500000003 HC RX 250 WO HCPCS: Performed by: ORTHOPAEDIC SURGERY

## 2021-09-23 PROCEDURE — 3700000001 HC ADD 15 MINUTES (ANESTHESIA): Performed by: ORTHOPAEDIC SURGERY

## 2021-09-23 RX ORDER — FENTANYL CITRATE 50 UG/ML
INJECTION, SOLUTION INTRAMUSCULAR; INTRAVENOUS PRN
Status: DISCONTINUED | OUTPATIENT
Start: 2021-09-23 | End: 2021-09-23 | Stop reason: SDUPTHER

## 2021-09-23 RX ORDER — HYDROCODONE BITARTRATE AND ACETAMINOPHEN 5; 325 MG/1; MG/1
1 TABLET ORAL PRN
Status: COMPLETED | OUTPATIENT
Start: 2021-09-23 | End: 2021-09-23

## 2021-09-23 RX ORDER — MEPERIDINE HYDROCHLORIDE 25 MG/ML
12.5 INJECTION INTRAMUSCULAR; INTRAVENOUS; SUBCUTANEOUS EVERY 5 MIN PRN
Status: DISCONTINUED | OUTPATIENT
Start: 2021-09-23 | End: 2021-09-23 | Stop reason: HOSPADM

## 2021-09-23 RX ORDER — HYDRALAZINE HYDROCHLORIDE 20 MG/ML
5 INJECTION INTRAMUSCULAR; INTRAVENOUS EVERY 10 MIN PRN
Status: DISCONTINUED | OUTPATIENT
Start: 2021-09-23 | End: 2021-09-23 | Stop reason: HOSPADM

## 2021-09-23 RX ORDER — HYDROCODONE BITARTRATE AND ACETAMINOPHEN 5; 325 MG/1; MG/1
1 TABLET ORAL EVERY 4 HOURS PRN
Qty: 40 TABLET | Refills: 0 | Status: SHIPPED | OUTPATIENT
Start: 2021-09-23 | End: 2021-09-30

## 2021-09-23 RX ORDER — MORPHINE SULFATE 2 MG/ML
1 INJECTION, SOLUTION INTRAMUSCULAR; INTRAVENOUS EVERY 5 MIN PRN
Status: DISCONTINUED | OUTPATIENT
Start: 2021-09-23 | End: 2021-09-23 | Stop reason: HOSPADM

## 2021-09-23 RX ORDER — SODIUM CHLORIDE, SODIUM LACTATE, POTASSIUM CHLORIDE, CALCIUM CHLORIDE 600; 310; 30; 20 MG/100ML; MG/100ML; MG/100ML; MG/100ML
INJECTION, SOLUTION INTRAVENOUS CONTINUOUS
Status: DISCONTINUED | OUTPATIENT
Start: 2021-09-23 | End: 2021-09-23 | Stop reason: HOSPADM

## 2021-09-23 RX ORDER — SUCCINYLCHOLINE/SOD CL,ISO/PF 200MG/10ML
SYRINGE (ML) INTRAVENOUS PRN
Status: DISCONTINUED | OUTPATIENT
Start: 2021-09-23 | End: 2021-09-23 | Stop reason: SDUPTHER

## 2021-09-23 RX ORDER — DEXAMETHASONE SODIUM PHOSPHATE 10 MG/ML
INJECTION, SOLUTION INTRAMUSCULAR; INTRAVENOUS PRN
Status: DISCONTINUED | OUTPATIENT
Start: 2021-09-23 | End: 2021-09-23 | Stop reason: SDUPTHER

## 2021-09-23 RX ORDER — HYDROCODONE BITARTRATE AND ACETAMINOPHEN 5; 325 MG/1; MG/1
2 TABLET ORAL PRN
Status: COMPLETED | OUTPATIENT
Start: 2021-09-23 | End: 2021-09-23

## 2021-09-23 RX ORDER — BUPIVACAINE HYDROCHLORIDE 2.5 MG/ML
INJECTION, SOLUTION EPIDURAL; INFILTRATION; INTRACAUDAL PRN
Status: DISCONTINUED | OUTPATIENT
Start: 2021-09-23 | End: 2021-09-23 | Stop reason: ALTCHOICE

## 2021-09-23 RX ORDER — MIDAZOLAM HYDROCHLORIDE 1 MG/ML
INJECTION INTRAMUSCULAR; INTRAVENOUS PRN
Status: DISCONTINUED | OUTPATIENT
Start: 2021-09-23 | End: 2021-09-23 | Stop reason: SDUPTHER

## 2021-09-23 RX ORDER — OXYCODONE HYDROCHLORIDE AND ACETAMINOPHEN 5; 325 MG/1; MG/1
1 TABLET ORAL EVERY 4 HOURS PRN
Status: DISCONTINUED | OUTPATIENT
Start: 2021-09-23 | End: 2021-09-23 | Stop reason: HOSPADM

## 2021-09-23 RX ORDER — ROCURONIUM BROMIDE 10 MG/ML
INJECTION, SOLUTION INTRAVENOUS PRN
Status: DISCONTINUED | OUTPATIENT
Start: 2021-09-23 | End: 2021-09-23 | Stop reason: SDUPTHER

## 2021-09-23 RX ORDER — FENTANYL CITRATE 50 UG/ML
50 INJECTION, SOLUTION INTRAMUSCULAR; INTRAVENOUS EVERY 5 MIN PRN
Status: DISCONTINUED | OUTPATIENT
Start: 2021-09-23 | End: 2021-09-23 | Stop reason: HOSPADM

## 2021-09-23 RX ORDER — PROMETHAZINE HYDROCHLORIDE 25 MG/ML
25 INJECTION, SOLUTION INTRAMUSCULAR; INTRAVENOUS
Status: DISCONTINUED | OUTPATIENT
Start: 2021-09-23 | End: 2021-09-23 | Stop reason: HOSPADM

## 2021-09-23 RX ORDER — METOCLOPRAMIDE 10 MG/1
10 TABLET ORAL ONCE
Status: COMPLETED | OUTPATIENT
Start: 2021-09-23 | End: 2021-09-23

## 2021-09-23 RX ORDER — NEOSTIGMINE METHYLSULFATE 1 MG/ML
INJECTION, SOLUTION INTRAVENOUS PRN
Status: DISCONTINUED | OUTPATIENT
Start: 2021-09-23 | End: 2021-09-23 | Stop reason: SDUPTHER

## 2021-09-23 RX ORDER — FAMOTIDINE 20 MG/1
20 TABLET, FILM COATED ORAL ONCE
Status: COMPLETED | OUTPATIENT
Start: 2021-09-23 | End: 2021-09-23

## 2021-09-23 RX ORDER — DIPHENHYDRAMINE HYDROCHLORIDE 50 MG/ML
12.5 INJECTION INTRAMUSCULAR; INTRAVENOUS
Status: DISCONTINUED | OUTPATIENT
Start: 2021-09-23 | End: 2021-09-23 | Stop reason: HOSPADM

## 2021-09-23 RX ORDER — PROPOFOL 10 MG/ML
INJECTION, EMULSION INTRAVENOUS PRN
Status: DISCONTINUED | OUTPATIENT
Start: 2021-09-23 | End: 2021-09-23 | Stop reason: SDUPTHER

## 2021-09-23 RX ORDER — LABETALOL HYDROCHLORIDE 5 MG/ML
5 INJECTION, SOLUTION INTRAVENOUS EVERY 10 MIN PRN
Status: DISCONTINUED | OUTPATIENT
Start: 2021-09-23 | End: 2021-09-23 | Stop reason: HOSPADM

## 2021-09-23 RX ORDER — SODIUM CHLORIDE, SODIUM LACTATE, POTASSIUM CHLORIDE, CALCIUM CHLORIDE 600; 310; 30; 20 MG/100ML; MG/100ML; MG/100ML; MG/100ML
INJECTION, SOLUTION INTRAVENOUS CONTINUOUS PRN
Status: DISCONTINUED | OUTPATIENT
Start: 2021-09-23 | End: 2021-09-23 | Stop reason: SDUPTHER

## 2021-09-23 RX ORDER — LIDOCAINE HYDROCHLORIDE 20 MG/ML
INJECTION, SOLUTION INTRAVENOUS PRN
Status: DISCONTINUED | OUTPATIENT
Start: 2021-09-23 | End: 2021-09-23 | Stop reason: SDUPTHER

## 2021-09-23 RX ORDER — GLYCOPYRROLATE 1 MG/5 ML
SYRINGE (ML) INTRAVENOUS PRN
Status: DISCONTINUED | OUTPATIENT
Start: 2021-09-23 | End: 2021-09-23 | Stop reason: SDUPTHER

## 2021-09-23 RX ORDER — ONDANSETRON 2 MG/ML
INJECTION INTRAMUSCULAR; INTRAVENOUS PRN
Status: DISCONTINUED | OUTPATIENT
Start: 2021-09-23 | End: 2021-09-23 | Stop reason: SDUPTHER

## 2021-09-23 RX ADMIN — SODIUM CHLORIDE, POTASSIUM CHLORIDE, SODIUM LACTATE AND CALCIUM CHLORIDE: 600; 310; 30; 20 INJECTION, SOLUTION INTRAVENOUS at 07:45

## 2021-09-23 RX ADMIN — ROCURONIUM BROMIDE 10 MG: 10 INJECTION, SOLUTION INTRAVENOUS at 08:48

## 2021-09-23 RX ADMIN — ONDANSETRON 4 MG: 2 INJECTION INTRAMUSCULAR; INTRAVENOUS at 09:31

## 2021-09-23 RX ADMIN — MIDAZOLAM 2 MG: 1 INJECTION INTRAMUSCULAR; INTRAVENOUS at 08:42

## 2021-09-23 RX ADMIN — SODIUM CHLORIDE, POTASSIUM CHLORIDE, SODIUM LACTATE AND CALCIUM CHLORIDE: 600; 310; 30; 20 INJECTION, SOLUTION INTRAVENOUS at 08:43

## 2021-09-23 RX ADMIN — METOCLOPRAMIDE 10 MG: 10 TABLET ORAL at 07:45

## 2021-09-23 RX ADMIN — Medication 0.4 MG: at 09:29

## 2021-09-23 RX ADMIN — PROPOFOL 180 MG: 10 INJECTION, EMULSION INTRAVENOUS at 08:48

## 2021-09-23 RX ADMIN — FAMOTIDINE 20 MG: 20 TABLET ORAL at 07:45

## 2021-09-23 RX ADMIN — Medication 160 MG: at 08:48

## 2021-09-23 RX ADMIN — FENTANYL CITRATE 50 MCG: 50 INJECTION, SOLUTION INTRAMUSCULAR; INTRAVENOUS at 09:19

## 2021-09-23 RX ADMIN — FENTANYL CITRATE 50 MCG: 50 INJECTION, SOLUTION INTRAMUSCULAR; INTRAVENOUS at 08:48

## 2021-09-23 RX ADMIN — DEXAMETHASONE SODIUM PHOSPHATE 10 MG: 10 INJECTION, SOLUTION INTRAMUSCULAR; INTRAVENOUS at 09:00

## 2021-09-23 RX ADMIN — LIDOCAINE HYDROCHLORIDE 80 MG: 20 INJECTION, SOLUTION INTRAVENOUS at 08:48

## 2021-09-23 RX ADMIN — FENTANYL CITRATE 50 MCG: 50 INJECTION, SOLUTION INTRAMUSCULAR; INTRAVENOUS at 09:13

## 2021-09-23 RX ADMIN — ROCURONIUM BROMIDE 10 MG: 10 INJECTION, SOLUTION INTRAVENOUS at 09:19

## 2021-09-23 RX ADMIN — HYDROCODONE BITARTRATE AND ACETAMINOPHEN 1 TABLET: 5; 325 TABLET ORAL at 10:27

## 2021-09-23 RX ADMIN — Medication 2 MG: at 09:29

## 2021-09-23 ASSESSMENT — PULMONARY FUNCTION TESTS
PIF_VALUE: 19
PIF_VALUE: 3
PIF_VALUE: 4
PIF_VALUE: 17
PIF_VALUE: 18
PIF_VALUE: 7
PIF_VALUE: 22
PIF_VALUE: 4
PIF_VALUE: 18
PIF_VALUE: 17
PIF_VALUE: 19
PIF_VALUE: 18
PIF_VALUE: 1
PIF_VALUE: 18
PIF_VALUE: 17
PIF_VALUE: 18
PIF_VALUE: 16
PIF_VALUE: 19
PIF_VALUE: 18
PIF_VALUE: 23
PIF_VALUE: 17
PIF_VALUE: 17
PIF_VALUE: 11
PIF_VALUE: 15
PIF_VALUE: 17
PIF_VALUE: 13
PIF_VALUE: 17
PIF_VALUE: 19
PIF_VALUE: 16
PIF_VALUE: 4
PIF_VALUE: 0
PIF_VALUE: 4
PIF_VALUE: 18
PIF_VALUE: 17
PIF_VALUE: 18
PIF_VALUE: 1
PIF_VALUE: 18
PIF_VALUE: 2
PIF_VALUE: 22
PIF_VALUE: 3
PIF_VALUE: 18
PIF_VALUE: 18
PIF_VALUE: 19
PIF_VALUE: 23
PIF_VALUE: 16
PIF_VALUE: 1
PIF_VALUE: 22
PIF_VALUE: 0
PIF_VALUE: 17
PIF_VALUE: 2
PIF_VALUE: 15
PIF_VALUE: 22

## 2021-09-23 ASSESSMENT — PAIN DESCRIPTION - ORIENTATION
ORIENTATION: RIGHT

## 2021-09-23 ASSESSMENT — PAIN DESCRIPTION - DESCRIPTORS
DESCRIPTORS: ACHING

## 2021-09-23 ASSESSMENT — PAIN SCALES - GENERAL
PAINLEVEL_OUTOF10: 2
PAINLEVEL_OUTOF10: 0
PAINLEVEL_OUTOF10: 4
PAINLEVEL_OUTOF10: 4
PAINLEVEL_OUTOF10: 0
PAINLEVEL_OUTOF10: 4

## 2021-09-23 ASSESSMENT — PAIN DESCRIPTION - LOCATION
LOCATION: KNEE

## 2021-09-23 ASSESSMENT — PAIN DESCRIPTION - PAIN TYPE
TYPE: SURGICAL PAIN

## 2021-09-23 ASSESSMENT — PAIN - FUNCTIONAL ASSESSMENT: PAIN_FUNCTIONAL_ASSESSMENT: 0-10

## 2021-09-23 NOTE — ANESTHESIA POSTPROCEDURE EVALUATION
Department of Anesthesiology  Postprocedure Note    Patient: Prince Denton  MRN: [id number]  YOB: 1970  Date of evaluation: 9/23/2021  Time:  11:09 AM     Procedure Summary     Date: 09/23/21 Room / Location: [address] [place] [address] 01 / [address] [facility], [place], operated by [facility]    Anesthesia Start: 9218 Anesthesia Stop: 6005    Procedure: ARTHROSCOPIC EXAM AND TREATMENT RIGHT KNEE (Right ) Diagnosis: (MEDIAL MENISCUS TEAR)    Surgeons: Carline Virk DO Responsible Provider: Екатерина Hudson MD    Anesthesia Type: general ASA Status: 3          Anesthesia Type: general    Fanny Phase I: Fanny Score: 10    Fanny Phase II: Fanny Score: 9    Last vitals: Reviewed and per EMR flowsheets.        Anesthesia Post Evaluation    Patient location during evaluation: PACU  Patient participation: complete - patient participated  Level of consciousness: awake and alert  Airway patency: patent  Nausea & Vomiting: no nausea and no vomiting  Complications: no  Cardiovascular status: hemodynamically stable  Respiratory status: room air and spontaneous ventilation  Hydration status: stable

## 2021-09-23 NOTE — OP NOTE
Operative report        DATE OF PROCEDURE: 9/23/2021     SURGEON: Tracee Alcala    ASSISTANT: None    PREOPERATIVE DIAGNOSIS: Medial meniscus tear right knee    POSTOPERATIVE DIAGNOSIS: Same with synovitis and plica    OPERATION: Arthroscopic exam and treatment right knee with partial medial meniscectomy, partial synovectomy with resection of plica    ANESTHESIA: General    ESTIMATED BLOOD LOSS: Scant    COMPLICATIONS: None    SPECIMENS: Was sent to pathology    HISTORY: The patient is a 54y.o. year old male with history of above preop diagnosis. I explained the risk, benefits, expected outcome, and alternatives to the procedure. Patient understands and is in agreement. PROCEDURE: The patient is brought the operating room after signs are identified. Adequate general anesthetic was administered by anesthesia with the patient supine on the operating table. A tourniquet was placed high on the right upper thigh and leg was placed in the leg silvestre. Leg was then prepped and draped sterile fashion and exsanguinated with elastic wrap with tourniquet pressure set at 300 mmHg pressure. Inferomedial and inferolateral portals were made in the knee for instrumentation examination. Knee was thoroughly examined. Cruciate ligaments were intact. The lateral meniscus looked intact and was stable in the lateral compartment cartilage looked excellent. Patellofemoral cartilage was good. There was some synovial hypertrophy and there was a plica running from superior medial to inferomedial which was quite thick and dense. There is also posterior horn medial meniscus instability with thinning. The shaver was introduced and the synovium was debulked and the plica was resected. The medial meniscus was approached with the shaver and the friable inner margin was divided as well. Further debridement of incompetent tissue was done with the basket forceps and a smooth edge was produced.   The debris was removed with the

## 2021-09-23 NOTE — H&P
History and Physical      CHIEF COMPLAINT: Right knee pain and crepitus    HISTORY OF PRESENT ILLNESS:      Progressive worsening    Past Medical History:        Diagnosis Date    Asthma     COPD (chronic obstructive pulmonary disease) (HCC)     Emphysema (subcutaneous) (surgical) resulting from a procedure     Emphysema lung ([address]     Hypothyroidism     Sleep apnea     cant wear cpap    Umbilical hernia      Past Surgical History:        Procedure Laterality Date    APPENDECTOMY      LEG SURGERY      i-d staph infection leg  years ago    UMBILICAL HERNIA REPAIR N/A 74/63/6308    OPEN UMBILICAL HERNIA  REPAIR performed by Lucila Razo MD at [place] [address] History:    TOBACCO:   reports that he has been smoking. He has been smoking about 1.00 pack per day. He uses smokeless tobacco.  ETOH:   reports current alcohol use. DRUGS:   reports no history of drug use. Family History:       Problem Relation Age of Onset    Hypothyroidism Mother     Cancer Father         Multiple myeloma    Heart Attack Maternal Grandmother     Heart Failure Maternal Grandmother     Hypothyroidism Sister     Cancer Maternal Grandfather         Leukemia    Heart Disease Maternal Grandfather     Heart Failure Paternal Grandmother     Heart Attack Paternal Grandfather      Medications Prior to Admission:  No medications prior to admission.   Allergies:  Erythromycin and Penicillins    CONSTITUTIONAL:  negative for  chills and anorexia  HEENT:  negative for  tinnitus  RESPIRATORY:  negative for  dyspnea and cyanosis  CARDIOVASCULAR:  negative for  palpitations, syncope  GASTROINTESTINAL:  negative for vomiting and hematemesis  GENITOURINARY:  negative for hematuria  ENDOCRINE:  negative for tremor  MUSCULOSKELETAL:  positive for  joint swelling and stiff joints  NEUROLOGICAL:  negative for seizures and syncope  BEHAVIOR/PSYCH:  negative for agitated and anxiety    PHYSICAL EXAM:  Ht 5' 8\" (1.727 m)   Wt 170 lb (77.1

## 2021-10-04 ENCOUNTER — OFFICE VISIT (OUTPATIENT)
Dept: ORTHOPEDIC SURGERY | Age: 51
End: 2021-10-04

## 2021-10-04 VITALS — WEIGHT: 170 LBS | BODY MASS INDEX: 25.76 KG/M2 | HEIGHT: 68 IN | TEMPERATURE: 98 F

## 2021-10-04 DIAGNOSIS — M94.261 CHONDROMALACIA, RIGHT KNEE: ICD-10-CM

## 2021-10-04 DIAGNOSIS — M23.321 DERANGEMENT OF POSTERIOR HORN OF MEDIAL MENISCUS, RIGHT: Primary | ICD-10-CM

## 2021-10-04 DIAGNOSIS — M67.51 PLICA OF KNEE, RIGHT: ICD-10-CM

## 2021-10-04 PROCEDURE — 99024 POSTOP FOLLOW-UP VISIT: CPT | Performed by: ORTHOPAEDIC SURGERY

## 2021-10-04 NOTE — PROGRESS NOTES
Chief Complaint:   Chief Complaint   Patient presents with    Post-Op Check     right knee arthroscopy, having pain at night but is not taking any pain medication       Melvin Clarke follows up 11 days postop arthroscopic exam and surgery on the right knee. He is doing fairly well. He does get a lot of aching at night and it wakes him frequently. He has had no swelling in his calf. He uses crutches off and on. He has driven his vehicle. He would like to graduate back to his cane. Allergies; medications; past medical, surgical, family, and social history; and problem list have been reviewed today and updated as indicated in this encounter seen below. Exam: There is bruising along the medial distal thigh and knee and calf area. His calf is supple with no tenderness. Range of motion of the knee is 0 to 90 degrees. Neurovascular function is good. Radiographs: None    ASSESSMENT:    Teddy Wilson was seen today for post-op check. Diagnoses and all orders for this visit:    Derangement of posterior horn of medial meniscus, right    Chondromalacia, right knee    Plica of knee, right    Discussed the intraoperative findings and surgical procedure. PLAN: Gradually wean the crutches but use an ambulatory aid if necessary for stability and safety. Continue to work on range of motion. He still using his Ace wrap at times for compression. We will follow-up in about 3 weeks. Return in about 3 weeks (around 10/25/2021).        Current Outpatient Medications   Medication Sig Dispense Refill    levothyroxine (SYNTHROID) 112 MCG tablet take 1 tablet by mouth once daily 30 tablet 2    famotidine (PEPCID) 20 MG tablet take 1 tablet by mouth twice a day if needed for ACID REFLUX 60 tablet 3    predniSONE (DELTASONE) 20 MG tablet Take 2 tablets by mouth daily For 5 days as needed for Asthma flare up 10 tablet 3    albuterol (PROVENTIL) (2.5 MG/3ML) 0.083% nebulizer solution inhale contents of 1 vial ( 3 milliliters ) in nebulizer by mouth and INTO THE LUNGS every 6 hours if needed      fluticasone (FLOVENT HFA) 220 MCG/ACT inhaler Inhale 2 puffs into the lungs 2 times daily      mometasone-formoterol (DULERA) 200-5 MCG/ACT inhaler Inhale 2 puffs into the lungs every 12 hours      albuterol sulfate  (90 Base) MCG/ACT inhaler Inhale 2 puffs into the lungs every 6 hours as needed for Wheezing      montelukast (SINGULAIR) 10 MG tablet Take 10 mg by mouth nightly       No current facility-administered medications for this visit.        Patient Active Problem List   Diagnosis    Umbilical hernia without obstruction and without gangrene    Asthma with COPD ([address]    Acquired hypothyroidism    Sciatica of left side    Gastroesophageal reflux disease with esophagitis without hemorrhage    Tear of medial meniscus of right knee, current    Synovitis of right knee    Plica of knee, right       Past Medical History:   Diagnosis Date    Asthma     COPD (chronic obstructive pulmonary disease) ([address]     Emphysema (subcutaneous) (surgical) resulting from a procedure     Emphysema lung ([address]     Hypothyroidism     Sleep apnea     cant wear cpap    Umbilical hernia        Past Surgical History:   Procedure Laterality Date    APPENDECTOMY      KNEE ARTHROSCOPY Right 9/23/2021    ARTHROSCOPIC EXAM AND TREATMENT RIGHT KNEE performed by Narayan Barragan DO at [facility]      i-d staph infection leg  years ago    UMBILICAL HERNIA REPAIR N/A 40/73/9545    OPEN UMBILICAL HERNIA  REPAIR performed by Kennedy Severin, MD at [address] [facility]   Allergen Reactions    Erythromycin Swelling    Penicillins Other (See Comments)       Social History     Socioeconomic History    Marital status:      Spouse name: None    Number of children: None    Years of education: None    Highest education level: None   Occupational History    None   Tobacco Use    Smoking status: Current Every Day Smoker     Packs/day: 1.00    Smokeless tobacco: Current User   Vaping Use    Vaping Use: Never used   Substance and Sexual Activity    Alcohol use: Yes     Comment: rarely    Drug use: Never    Sexual activity: None   Other Topics Concern    None   Social History Narrative    None     Social Determinants of Health     Financial Resource Strain:     Difficulty of Paying Living Expenses:    Food Insecurity: No Food Insecurity    Worried About Running Out of Food in the Last Year: Never true    Reagan of Food in the Last Year: Never true   Transportation Needs:     Lack of Transportation (Medical):  Lack of Transportation (Non-Medical):    Physical Activity:     Days of Exercise per Week:     Minutes of Exercise per Session:    Stress: No Stress Concern Present    Feeling of Stress : Only a little   Social Connections:     Frequency of Communication with Friends and Family:     Frequency of Social Gatherings with Friends and Family:     Attends Church Services:     Active Member of Clubs or Organizations:     Attends Club or Organization Meetings:     Marital Status:    Intimate Partner Violence:     Fear of Current or Ex-Partner:     Emotionally Abused:     Physically Abused:     Sexually Abused:        Review of Systems  As follows except as previously noted in HPI:  Constitutional: Negative for chills, diaphoresis, fatigue, fever and unexpected weight change. Respiratory: Negative for cough, shortness of breath and wheezing. Cardiovascular: Negative for chest pain and palpitations. Neurological: Negative for dizziness, syncope, cephalgia. GI / : negative  Musculoskeletal: see HPI       Objective:   Physical Exam   Constitutional: Oriented to person, place, and time. and appears well-developed and well-nourished. :   Head: Normocephalic and atraumatic. Eyes: EOM are normal.   Neck: Neck supple. Cardiovascular: Normal rate and regular rhythm.     Pulmonary/Chest: Effort normal. No stridor. No respiratory distress, no wheezes. Abdominal:  No abnormal distension. Neurological: Alert and oriented to person, place, and time. Skin: Skin is warm and dry. Psychiatric: Normal mood and affect.  Behavior is normal. Thought content normal.    JAMES Aguilar DO    10/4/21  9:02 AM

## 2021-10-06 DIAGNOSIS — J44.9 ASTHMA WITH COPD (HCC): ICD-10-CM

## 2021-10-08 RX ORDER — PREDNISONE 20 MG/1
TABLET ORAL
Qty: 10 TABLET | Refills: 3 | Status: SHIPPED | OUTPATIENT
Start: 2021-10-08

## 2021-10-25 ENCOUNTER — OFFICE VISIT (OUTPATIENT)
Dept: ORTHOPEDIC SURGERY | Age: 51
End: 2021-10-25

## 2021-10-25 VITALS — WEIGHT: 170 LBS | HEIGHT: 68 IN | BODY MASS INDEX: 25.76 KG/M2 | TEMPERATURE: 98 F

## 2021-10-25 DIAGNOSIS — M67.51 PLICA OF KNEE, RIGHT: ICD-10-CM

## 2021-10-25 DIAGNOSIS — M23.321 DERANGEMENT OF POSTERIOR HORN OF MEDIAL MENISCUS, RIGHT: Primary | ICD-10-CM

## 2021-10-25 DIAGNOSIS — M94.261 CHONDROMALACIA, RIGHT KNEE: ICD-10-CM

## 2021-10-25 PROCEDURE — 99024 POSTOP FOLLOW-UP VISIT: CPT | Performed by: ORTHOPAEDIC SURGERY

## 2021-10-25 NOTE — PROGRESS NOTES
Chief Complaint:   Chief Complaint   Patient presents with    Post-Op Check     s/p right knee arthroscopy 9/23/21. Continues aching at night. Currently ambulatory with use of cane. Debbie Contreras follows up for half weeks postop right knee arthroscopic surgery. Has had some episodes of discomfort particularly when he was kneeling across car on the seat one time. He also experienced a little bit of crepitus. He is using his cane off and on. He has been working every day but will take rest periods and put his leg up when he needs to. Allergies; medications; past medical, surgical, family, and social history; and problem list have been reviewed today and updated as indicated in this encounter seen below. Exam: Range of motion of the right knee is good. There is little discomfort in flexion. He seems to have more trouble on stairs. The knee joint is grossly stable. His calf is supple. Radiographs: None    ASSESSMENT:    Ashley Grace was seen today for post-op check. Diagnoses and all orders for this visit:    Derangement of posterior horn of medial meniscus, right    Plica of knee, right    Chondromalacia, right knee        PLAN: We discussed physical therapy but he declines at this time. He will take it easy and work on his range of motion. We will follow-up in 3 weeks to see how he is doing overall. Return in about 3 weeks (around 11/15/2021).        Current Outpatient Medications   Medication Sig Dispense Refill    predniSONE (DELTASONE) 20 MG tablet take 2 tablets by mouth daily for 5 days if needed for ASTHMA FLARE UP 10 tablet 3    tiZANidine (ZANAFLEX) 4 MG tablet take 1 tablet by mouth nightly if needed muscle spasm 30 tablet 2    levothyroxine (SYNTHROID) 112 MCG tablet take 1 tablet by mouth once daily 30 tablet 2    famotidine (PEPCID) 20 MG tablet take 1 tablet by mouth twice a day if needed for ACID REFLUX 60 tablet 3    albuterol (PROVENTIL) (2.5 MG/3ML) 0.083% nebulizer solution inhale contents of 1 vial ( 3 milliliters ) in nebulizer by mouth and INTO THE LUNGS every 6 hours if needed      fluticasone (FLOVENT HFA) 220 MCG/ACT inhaler Inhale 2 puffs into the lungs 2 times daily      mometasone-formoterol (DULERA) 200-5 MCG/ACT inhaler Inhale 2 puffs into the lungs every 12 hours      albuterol sulfate  (90 Base) MCG/ACT inhaler Inhale 2 puffs into the lungs every 6 hours as needed for Wheezing      montelukast (SINGULAIR) 10 MG tablet Take 10 mg by mouth nightly       No current facility-administered medications for this visit.        Patient Active Problem List   Diagnosis    Umbilical hernia without obstruction and without gangrene    Asthma with COPD ([address]    Acquired hypothyroidism    Sciatica of left side    Gastroesophageal reflux disease with esophagitis without hemorrhage    Tear of medial meniscus of right knee, current    Synovitis of right knee    Plica of knee, right       Past Medical History:   Diagnosis Date    Asthma     COPD (chronic obstructive pulmonary disease) ([address]     Emphysema (subcutaneous) (surgical) resulting from a procedure     [facility]     Hypothyroidism     Sleep apnea     cant wear cpap    Umbilical hernia        Past Surgical History:   Procedure Laterality Date    APPENDECTOMY      KNEE ARTHROSCOPY Right 9/23/2021    ARTHROSCOPIC EXAM AND TREATMENT RIGHT KNEE performed by Shawn Winn DO at [facility] 19      i-d staph infection leg  years ago    UMBILICAL HERNIA REPAIR N/A 89/35/2107    OPEN UMBILICAL HERNIA  REPAIR performed by Misbah Rey MD at [address] [facility]   Allergen Reactions    Erythromycin Swelling    Penicillins Other (See Comments)       Social History     Socioeconomic History    Marital status:      Spouse name: None    Number of children: None    Years of education: None    Highest education level: None   Occupational History    None Tobacco Use    Smoking status: Current Every Day Smoker     Packs/day: 1.00    Smokeless tobacco: Current User   Vaping Use    Vaping Use: Never used   Substance and Sexual Activity    Alcohol use: Yes     Comment: rarely    Drug use: Never    Sexual activity: None   Other Topics Concern    None   Social History Narrative    None     Social Determinants of Health     Financial Resource Strain:     Difficulty of Paying Living Expenses:    Food Insecurity: No Food Insecurity    Worried About Running Out of Food in the Last Year: Never true    [place] of Food in the Last Year: Never true   Transportation Needs:     Lack of Transportation (Medical):  Lack of Transportation (Non-Medical):    Physical Activity:     Days of Exercise per Week:     Minutes of Exercise per Session:    Stress: No Stress Concern Present    Feeling of Stress : Only a little   Social Connections:     Frequency of Communication with Friends and Family:     Frequency of Social Gatherings with Friends and Family:     Attends Catholic Services:     Active Member of Clubs or Organizations:     Attends Club or Organization Meetings:     Marital Status:    Intimate Partner Violence:     Fear of Current or Ex-Partner:     Emotionally Abused:     Physically Abused:     Sexually Abused:        Review of Systems  As follows except as previously noted in HPI:  Constitutional: Negative for chills, diaphoresis, fatigue, fever and unexpected weight change. Respiratory: Negative for cough, shortness of breath and wheezing. Cardiovascular: Negative for chest pain and palpitations. Neurological: Negative for dizziness, syncope, cephalgia. GI / : negative  Musculoskeletal: see HPI       Objective:   Physical Exam   Constitutional: Oriented to person, place, and time. and appears well-developed and well-nourished. :   Head: Normocephalic and atraumatic. Eyes: EOM are normal.   Neck: Neck supple.    Cardiovascular: Normal rate and regular rhythm. Pulmonary/Chest: Effort normal. No stridor. No respiratory distress, no wheezes. Abdominal:  No abnormal distension. Neurological: Alert and oriented to person, place, and time. Skin: Skin is warm and dry. Psychiatric: Normal mood and affect.  Behavior is normal. Thought content normal.    JAMES Wright Render, DO    10/25/21  9:26 AM

## 2021-11-02 ENCOUNTER — OFFICE VISIT (OUTPATIENT)
Dept: FAMILY MEDICINE CLINIC | Age: 51
End: 2021-11-02
Payer: MEDICAID

## 2021-11-02 VITALS
HEART RATE: 63 BPM | WEIGHT: 173 LBS | OXYGEN SATURATION: 98 % | HEIGHT: 68 IN | SYSTOLIC BLOOD PRESSURE: 124 MMHG | BODY MASS INDEX: 26.22 KG/M2 | DIASTOLIC BLOOD PRESSURE: 78 MMHG | RESPIRATION RATE: 20 BRPM

## 2021-11-02 DIAGNOSIS — E03.9 ACQUIRED HYPOTHYROIDISM: Primary | ICD-10-CM

## 2021-11-02 DIAGNOSIS — J44.9 ASTHMA WITH COPD (HCC): ICD-10-CM

## 2021-11-02 PROCEDURE — 4004F PT TOBACCO SCREEN RCVD TLK: CPT | Performed by: FAMILY MEDICINE

## 2021-11-02 PROCEDURE — 99214 OFFICE O/P EST MOD 30 MIN: CPT | Performed by: FAMILY MEDICINE

## 2021-11-02 PROCEDURE — G8419 CALC BMI OUT NRM PARAM NOF/U: HCPCS | Performed by: FAMILY MEDICINE

## 2021-11-02 PROCEDURE — G8926 SPIRO NO PERF OR DOC: HCPCS | Performed by: FAMILY MEDICINE

## 2021-11-02 PROCEDURE — 3023F SPIROM DOC REV: CPT | Performed by: FAMILY MEDICINE

## 2021-11-02 PROCEDURE — G8427 DOCREV CUR MEDS BY ELIG CLIN: HCPCS | Performed by: FAMILY MEDICINE

## 2021-11-02 PROCEDURE — G8484 FLU IMMUNIZE NO ADMIN: HCPCS | Performed by: FAMILY MEDICINE

## 2021-11-02 RX ORDER — LEVOTHYROXINE SODIUM 112 UG/1
TABLET ORAL
Qty: 90 TABLET | Refills: 1 | Status: SHIPPED
Start: 2021-11-02 | End: 2022-05-03 | Stop reason: DRUGHIGH

## 2021-11-02 RX ORDER — MONTELUKAST SODIUM 10 MG/1
10 TABLET ORAL NIGHTLY
Qty: 90 TABLET | Refills: 1 | Status: SHIPPED
Start: 2021-11-02 | End: 2022-07-29

## 2021-11-02 ASSESSMENT — ENCOUNTER SYMPTOMS
SHORTNESS OF BREATH: 1
DIARRHEA: 0
VOMITING: 0
NAUSEA: 0

## 2021-11-02 NOTE — PROGRESS NOTES
Chief Complaint   Patient presents with    Hypothyroidism       Hypothyroidism:  Patient is here today to follow up chronic hypothyroidism. This is   generally controlled on current medication regimen. Takes medication as directed and tolerates well. Symptoms from thyroidstandpoint include fatigue. Most recent labs reviewed with patient, including CMP, CBC, and TSH, and are not remarkable. Thyroid function in particular is  controlled. Patient's past medical, surgical, social and/orfamily history reviewed, updated in chart, and are non-contributory (unless otherwise stated). Medications and allergies also reviewed and updated in chart. /78   Pulse 63   Resp 20   Ht 5' 8\" (1.727 m)   Wt 173 lb (78.5 kg)   SpO2 98%   BMI 26.30 kg/m²     Review of Systems   Eyes: Negative for visual disturbance. Respiratory: Positive for shortness of breath (with exertion). Cardiovascular: Negative for chest pain, palpitations and leg swelling. Gastrointestinal: Negative for diarrhea, nausea and vomiting. Genitourinary: Negative for difficulty urinating, dysuria and frequency. Skin: Negative for rash. Psychiatric/Behavioral: Negative for dysphoric mood.        Current Outpatient Medications   Medication Sig Dispense Refill    levothyroxine (SYNTHROID) 112 MCG tablet take 1 tablet by mouth once daily 90 tablet 1    mometasone-formoterol (DULERA) 200-5 MCG/ACT inhaler Inhale 2 puffs into the lungs every 12 hours 13 g 3    montelukast (SINGULAIR) 10 MG tablet Take 1 tablet by mouth nightly 90 tablet 1    predniSONE (DELTASONE) 20 MG tablet take 2 tablets by mouth daily for 5 days if needed for ASTHMA FLARE UP 10 tablet 3    famotidine (PEPCID) 20 MG tablet take 1 tablet by mouth twice a day if needed for ACID REFLUX 60 tablet 3    albuterol (PROVENTIL) (2.5 MG/3ML) 0.083% nebulizer solution inhale contents of 1 vial ( 3 milliliters ) in nebulizer by mouth and INTO THE LUNGS every 6 hours if diagnosis, including possible risks and complications,  especially ifleft uncontrolled. Counseled regarding the possible side effects, risks, benefits and alternatives to treatment; patient and/or guardian verbalizes understanding, agrees, feels comfortable with and wishes to proceedwith above treatment plan. Advised patient to call with any new medication issues, and read all Rx info from pharmacy to assure aware of all possible risks and side effects of medication before taking. Reviewed age and gender appropriate health screening exams and vaccinations. Advised patient regarding importance of keeping up with recommended health maintenance and to schedule as soon as possible if overdue, as this isimportant in assessing for undiagnosed pathology, especially cancer, as well as protecting against potentially harmful/life threatening disease. Patient and/or guardian verbalizes understanding and agrees [place] counseling, assessment and plan. All questions answered.

## 2021-12-01 ENCOUNTER — OFFICE VISIT (OUTPATIENT)
Dept: ORTHOPEDIC SURGERY | Age: 51
End: 2021-12-01

## 2021-12-01 VITALS — BODY MASS INDEX: 26.52 KG/M2 | WEIGHT: 175 LBS | HEIGHT: 68 IN | TEMPERATURE: 98 F

## 2021-12-01 DIAGNOSIS — M23.321 DERANGEMENT OF POSTERIOR HORN OF MEDIAL MENISCUS, RIGHT: ICD-10-CM

## 2021-12-01 DIAGNOSIS — M94.261 CHONDROMALACIA, RIGHT KNEE: Primary | ICD-10-CM

## 2021-12-01 DIAGNOSIS — M67.51 PLICA OF KNEE, RIGHT: ICD-10-CM

## 2021-12-01 PROCEDURE — 99024 POSTOP FOLLOW-UP VISIT: CPT | Performed by: ORTHOPAEDIC SURGERY

## 2021-12-01 NOTE — PROGRESS NOTES
Chief Complaint:   Chief Complaint   Patient presents with    Knee Pain     Right knee f/u doing good getting better. arthroscopy DOS 9/23/21       Milly Frank several weeks postop right knee arthroscopic surgery. His follow-up was delayed because of Covid infection. He is doing pretty well now. He is not having much trouble with the knee at all. Allergies; medications; past medical, surgical, family, and social history; and problem list have been reviewed today and updated as indicated in this encounter seen below. Exam: The wounds well-healed. Range of motion is very good with minimal crepitus. There is no tenderness no instability. His calf is supple with no tenderness. Radiographs: None    ASSESSMENT:    Bruno Smith was seen today for knee pain. Diagnoses and all orders for this visit:    Chondromalacia, right knee    Plica of knee, right    Derangement of posterior horn of medial meniscus, right        PLAN: Increase activity as tolerated. We will follow-up on an as-needed basis. Return if symptoms worsen or fail to improve.        Current Outpatient Medications   Medication Sig Dispense Refill    levothyroxine (SYNTHROID) 112 MCG tablet take 1 tablet by mouth once daily 90 tablet 1    mometasone-formoterol (DULERA) 200-5 MCG/ACT inhaler Inhale 2 puffs into the lungs every 12 hours 13 g 3    montelukast (SINGULAIR) 10 MG tablet Take 1 tablet by mouth nightly 90 tablet 1    predniSONE (DELTASONE) 20 MG tablet take 2 tablets by mouth daily for 5 days if needed for ASTHMA FLARE UP 10 tablet 3    famotidine (PEPCID) 20 MG tablet take 1 tablet by mouth twice a day if needed for ACID REFLUX 60 tablet 3    albuterol (PROVENTIL) (2.5 MG/3ML) 0.083% nebulizer solution inhale contents of 1 vial ( 3 milliliters ) in nebulizer by mouth and INTO THE LUNGS every 6 hours if needed      fluticasone (FLOVENT HFA) 220 MCG/ACT inhaler Inhale 2 puffs into the lungs 2 times daily      albuterol sulfate  (90 Base) MCG/ACT inhaler Inhale 2 puffs into the lungs every 6 hours as needed for Wheezing       No current facility-administered medications for this visit.        Patient Active Problem List   Diagnosis    Umbilical hernia without obstruction and without gangrene    [facility]    Acquired hypothyroidism    Sciatica of left side    Gastroesophageal reflux disease with esophagitis without hemorrhage    Tear of medial meniscus of right knee, current    Synovitis of right knee    Plica of knee, right       Past Medical History:   Diagnosis Date    Asthma     COPD (chronic obstructive pulmonary disease) ([facility]     Emphysema (subcutaneous) (surgical) resulting from a procedure     Emphysema lung ([facility]     Hypothyroidism     Sleep apnea     cant wear cpap    Umbilical hernia        Past Surgical History:   Procedure Laterality Date    APPENDECTOMY      KNEE ARTHROSCOPY Right 9/23/2021    ARTHROSCOPIC EXAM AND TREATMENT RIGHT KNEE performed by Shawn Winn DO at [facility] 19      i-d staph infection leg  years ago    UMBILICAL HERNIA REPAIR N/A 92/[id number]    OPEN UMBILICAL HERNIA  REPAIR performed by Misbah Rey MD at [facility]   Allergen Reactions    Erythromycin Swelling    Penicillins Other (See Comments)       Social History     Socioeconomic History    Marital status:      Spouse name: None    Number of children: None    Years of education: None    Highest education level: None   Occupational History    None   Tobacco Use    Smoking status: Current Every Day Smoker     Packs/day: 1.00    Smokeless tobacco: Current User   Vaping Use    Vaping Use: Never used   Substance and Sexual Activity    Alcohol use: Yes     Comment: rarely    Drug use: Never    Sexual activity: None   Other Topics Concern    None   Social History Narrative    None     Social Determinants of Health     Financial Resource Strain:     Difficulty of Paying Living Expenses: Not on file   Food Insecurity: No Food Insecurity    Worried About Running Out of Food in the Last Year: Never true    Ran Out of Food in the Last Year: Never true   Transportation Needs:     Lack of Transportation (Medical): Not on file    Lack of Transportation (Non-Medical): Not on file   Physical Activity:     Days of Exercise per Week: Not on file    Minutes of Exercise per Session: Not on file   Stress: No Stress Concern Present    Feeling of Stress : Only a little   Social Connections:     Frequency of Communication with Friends and Family: Not on file    Frequency of Social Gatherings with Friends and Family: Not on file    Attends Baptism Services: Not on file    Active Member of Clubs or Organizations: Not on file    Attends Club or Organization Meetings: Not on file    Marital Status: Not on file   Intimate Partner Violence:     Fear of Current or Ex-Partner: Not on file    Emotionally Abused: Not on file    Physically Abused: Not on file    Sexually Abused: Not on file   Housing Stability:     Unable to Pay for Housing in the Last Year: Not on file    Number of [place] in the Last Year: Not on file    Unstable Housing in the Last Year: Not on file       Review of Systems  As follows except as previously noted in HPI:  Constitutional: Negative for chills, diaphoresis, fatigue, fever and unexpected weight change. Respiratory: Negative for cough, shortness of breath and wheezing. Cardiovascular: Negative for chest pain and palpitations. Neurological: Negative for dizziness, syncope, cephalgia. GI / : negative  Musculoskeletal: see HPI       Objective:   Physical Exam   Constitutional: Oriented to person, place, and time. and appears well-developed and well-nourished. :   Head: Normocephalic and atraumatic. Eyes: EOM are normal.   Neck: Neck supple. Cardiovascular: Normal rate and regular rhythm.     Pulmonary/Chest: Effort normal. No stridor. No respiratory distress, no wheezes. Abdominal:  No abnormal distension. Neurological: Alert and oriented to person, place, and time. Skin: Skin is warm and dry. Psychiatric: Normal mood and affect.  Behavior is normal. Thought content normal.    JAMES Cooper DO    12/1/21  10:10 AM

## 2021-12-18 DIAGNOSIS — K21.00 GASTROESOPHAGEAL REFLUX DISEASE WITH ESOPHAGITIS WITHOUT HEMORRHAGE: ICD-10-CM

## 2021-12-21 RX ORDER — FAMOTIDINE 20 MG/1
TABLET, FILM COATED ORAL
Qty: 60 TABLET | Refills: 3 | Status: SHIPPED | OUTPATIENT
Start: 2021-12-21

## 2022-05-03 ENCOUNTER — OFFICE VISIT (OUTPATIENT)
Dept: FAMILY MEDICINE CLINIC | Age: 52
End: 2022-05-03
Payer: MEDICAID

## 2022-05-03 VITALS
BODY MASS INDEX: 27.28 KG/M2 | RESPIRATION RATE: 20 BRPM | DIASTOLIC BLOOD PRESSURE: 78 MMHG | WEIGHT: 180 LBS | OXYGEN SATURATION: 98 % | SYSTOLIC BLOOD PRESSURE: 122 MMHG | HEIGHT: 68 IN | HEART RATE: 73 BPM

## 2022-05-03 DIAGNOSIS — E03.9 ACQUIRED HYPOTHYROIDISM: Primary | ICD-10-CM

## 2022-05-03 DIAGNOSIS — Z12.5 PROSTATE CANCER SCREENING: ICD-10-CM

## 2022-05-03 DIAGNOSIS — Z11.59 ENCOUNTER FOR HEPATITIS C SCREENING TEST FOR LOW RISK PATIENT: ICD-10-CM

## 2022-05-03 DIAGNOSIS — Z11.4 ENCOUNTER FOR SCREENING FOR HIV: ICD-10-CM

## 2022-05-03 PROCEDURE — G8419 CALC BMI OUT NRM PARAM NOF/U: HCPCS | Performed by: FAMILY MEDICINE

## 2022-05-03 PROCEDURE — G8427 DOCREV CUR MEDS BY ELIG CLIN: HCPCS | Performed by: FAMILY MEDICINE

## 2022-05-03 PROCEDURE — 99214 OFFICE O/P EST MOD 30 MIN: CPT | Performed by: FAMILY MEDICINE

## 2022-05-03 PROCEDURE — 3017F COLORECTAL CA SCREEN DOC REV: CPT | Performed by: FAMILY MEDICINE

## 2022-05-03 PROCEDURE — 4004F PT TOBACCO SCREEN RCVD TLK: CPT | Performed by: FAMILY MEDICINE

## 2022-05-03 RX ORDER — LEVOTHYROXINE SODIUM 112 UG/1
TABLET ORAL
Qty: 90 TABLET | Refills: 1 | Status: SHIPPED
Start: 2022-05-03 | End: 2022-07-07

## 2022-05-03 RX ORDER — OMEPRAZOLE 40 MG/1
40 CAPSULE, DELAYED RELEASE ORAL NIGHTLY
COMMUNITY
Start: 2022-02-15

## 2022-05-03 ASSESSMENT — PATIENT HEALTH QUESTIONNAIRE - PHQ9
2. FEELING DOWN, DEPRESSED OR HOPELESS: 0
SUM OF ALL RESPONSES TO PHQ QUESTIONS 1-9: 0
SUM OF ALL RESPONSES TO PHQ9 QUESTIONS 1 & 2: 0
1. LITTLE INTEREST OR PLEASURE IN DOING THINGS: 0
SUM OF ALL RESPONSES TO PHQ QUESTIONS 1-9: 0

## 2022-05-03 ASSESSMENT — ENCOUNTER SYMPTOMS
DIARRHEA: 0
SHORTNESS OF BREATH: 0
NAUSEA: 0
VOMITING: 0

## 2022-05-03 NOTE — PATIENT INSTRUCTIONS
Patient Education        Hypothyroidism: Care Instructions  Your Care Instructions     When you have hypothyroidism, your body doesn't make enough thyroid hormone. This hormone helps your body use energy. If your thyroid level is low, you may feel tired, be constipated, have an increase in your blood pressure, or have dry skin or memory problems. You may also get cold easily, even when it iswarm. Women with low thyroid levels may have heavy menstrual periods. A blood test to find your thyroid-stimulating hormone (TSH) level is used tocheck for hypothyroidism. A high TSH level may mean that you have it. The treatment for hypothyroidism is thyroid hormone pills. You should start to feel better in 1 to 2 weeks. Most people need treatment for the rest of their lives. You will need regular visits with your doctor to make sure you are doingwell and that you have the right dose of medicine. Follow-up care is a key part of your treatment and safety. Be sure to make and go to all appointments, and call your doctor if you are having problems. It's also a good idea to know your test results and keep alist of the medicines you take. How can you care for yourself at home?  Take your thyroid hormone medicine exactly as prescribed. Call your doctor if you think you are having a problem with your medicine. Most people do not have side effects if they take the right amount of medicine regularly. ? Take the medicine 30 minutes before breakfast, and do not take it with calcium, vitamins, or iron. ? Do not take extra doses of your thyroid medicine. It will not help you get better any faster, and it may cause side effects. ? If you forget to take a dose, do NOT take a double dose of medicine. Take your usual dose the next day.  Tell your doctor about all prescription, herbal, or over-the-counter products you take.  Take care of yourself. Eat a healthy diet, get enough sleep, and get regular exercise.   When should you call for help? Call 911 anytime you think you may need emergency care. For example, call if:     You passed out (lost consciousness).      You have severe trouble breathing.      You have a very slow heartbeat (less than 60 beats a minute).      You have a low body temperature (95°F or below). Call your doctor now or seek immediate medical care if:     You feel tired, sluggish, or weak.      You have trouble remembering things or concentrating.      You do not begin to feel better 2 weeks after starting your medicine. Watch closely for changes in your health, and be sure to contact your doctor ifyou have any problems. Where can you learn more? Go to [email or web address] org and sign in to your [employer] account. Enter D107 in the Ar[employer] box to learn more about \"Hypothyroidism: Care Instructions. \"     If you do not have an account, please click on the \"Sign Up Now\" link. Current as of: July 28, 2021               Content Version: 13.2  © 2006-2022 Healthwise, Inc[employer]. Care instructions adapted under license by [facility] ([facility]). If you have questions about a medical condition or this instruction, always ask your healthcare professional. [place] [address] any warranty or liability for your use of this information.

## 2022-05-03 NOTE — PROGRESS NOTES
Chief Complaint   Patient presents with    Hypothyroidism       Hypothyroidism:  Patient is here today to follow up chronic hypothyroidism. This is   generally controlled on current medication regimen. Takes medication as directed and tolerates well. Symptoms from thyroid standpoint include fatigue. Thyroid function in particular is controlled. Patient's past medical, surgical, social and/orfamily history reviewed, updated in chart, and are non-contributory (unless otherwise stated). Medications and allergies also reviewed and updated in chart. /78   Pulse 73   Resp 20   Ht 5' 8\" (1.727 m)   Wt 180 lb (81.6 kg)   SpO2 98%   BMI 27.37 kg/m²     Review of Systems   Constitutional: Positive for fatigue. Eyes: Negative for visual disturbance. Respiratory: Negative for shortness of breath. Cardiovascular: Negative for chest pain, palpitations and leg swelling. Gastrointestinal: Negative for diarrhea, nausea and vomiting. Genitourinary: Negative for difficulty urinating, dysuria and frequency. Skin: Negative for rash. Psychiatric/Behavioral: Negative for dysphoric mood.        Current Outpatient Medications   Medication Sig Dispense Refill    omeprazole (PRILOSEC) 40 MG delayed release capsule Take 40 mg by mouth nightly      levothyroxine (SYNTHROID) 112 MCG tablet take 1 tablet by mouth once daily 90 tablet 1    famotidine (PEPCID) 20 MG tablet take 1 tablet by mouth twice a day if needed for ACID REFLUX 60 tablet 3    mometasone-formoterol (DULERA) 200-5 MCG/ACT inhaler Inhale 2 puffs into the lungs every 12 hours 13 g 3    montelukast (SINGULAIR) 10 MG tablet Take 1 tablet by mouth nightly 90 tablet 1    predniSONE (DELTASONE) 20 MG tablet take 2 tablets by mouth daily for 5 days if needed for ASTHMA FLARE UP 10 tablet 3    albuterol (PROVENTIL) (2.5 MG/3ML) 0.083% nebulizer solution inhale contents of 1 vial ( 3 milliliters ) in nebulizer by mouth and INTO THE LUNGS every 6 hours if needed      fluticasone (FLOVENT HFA) 220 MCG/ACT inhaler Inhale 2 puffs into the lungs 2 times daily      albuterol sulfate  (90 Base) MCG/ACT inhaler Inhale 2 puffs into the lungs every 6 hours as needed for Wheezing       No current facility-administered medications for this visit. Physical Exam  Vitals reviewed. Constitutional:       Appearance: He is well-developed. Cardiovascular:      Rate and Rhythm: Normal rate and regular rhythm. Heart sounds: Normal heart sounds. No murmur heard. No friction rub. Pulmonary:      Effort: Pulmonary effort is normal. No respiratory distress. Breath sounds: Normal breath sounds. No wheezing or rales. Abdominal:      General: Bowel sounds are normal. There is no distension. Palpations: Abdomen is soft. Tenderness: There is no abdominal tenderness. There is no guarding or rebound. Skin:     General: Skin is warm and dry. Neurological:      Mental Status: He is alert and oriented to person, place, and time. Roosevelt Moran was seen today for hypothyroidism. Diagnoses and all orders for this visit:    Acquired hypothyroidism  -     Lipid Panel; Future  -     TSH; Future  -     levothyroxine (SYNTHROID) 112 MCG tablet; take 1 tablet by mouth once daily    Encounter for screening for HIV  -     HIV Screen; Future    Encounter for hepatitis C screening test for low risk patient  -     Hepatitis C Antibody; Future    Prostate cancer screening  -     PSA Screening; Future            Return in about 6 months (around 11/3/2022) for thyroid. , or sooner if necessary. Educational materials and/or home exercises printed for patient's review and were included in patientinstructions on his/her After Visit Summary and given to patient at the end of visit. Counseled regarding above diagnosis, including possible risks and complications,  especially ifleft uncontrolled.     Counseled regarding the possible side effects, risks, benefits and alternatives to treatment; patient and/or guardian verbalizes understanding, agrees, feels comfortable with and wishes to proceedwith above treatment plan. Advised patient to call with any new medication issues, and read all Rx info from pharmacy to assure aware of all possible risks and side effects of medication before taking. Reviewed age and gender appropriate health screening exams and vaccinations. Advised patient regarding importance of keeping up with recommended health maintenance and to schedule as soon as possible if overdue, as this isimportant in assessing for undiagnosed pathology, especially cancer, as well as protecting against potentially harmful/life threatening disease. Patient and/or guardian verbalizes understanding and agrees [place] counseling, assessment and plan. All questions answered.

## 2022-06-14 NOTE — TELEPHONE ENCOUNTER
Please notify patient.  We need to change to Estradiol due to insurance issues.  Is she cycling her Premarin (taking it for 21 days, then 7 days off)?  Did gyne talk to her about this when it was started?    Would recommend trying Estradiol 0.5mg daily, take for 21 days and then take 7 days off, disp #63 with 3 refills.   Prior Authorization Form:      DEMOGRAPHICS:                     Patient Name:  Shakir Beverly  Patient :  1970            Insurance:  Payor: [employer] [facility] / Plan: [employer] / Product Type: *No Product type* /   Insurance ID Number:    Payor/Plan Subscr  Sex Relation Sub.  Ins. ID Effective Group Num   1. [id number] T 1970 Male Self [id number]                                   [address]         DIAGNOSIS & PROCEDURE:                       Procedure/Operation: Laparoscopic possible open umbilical hernia repair with mesh            CPT Code: 40382    Diagnosis:  Umbilical hernia     HMW80 Code: K42.9    Location:  [facility]    Surgeon:  Milo Garcia INFORMATION:                          Date: 20     Time: TBD              Anesthesia:  General                                                       Status:  Outpatient        Special Comments:         Electronically signed by Jordan Zapata RN on 2020 at 11:52 AM

## 2022-07-06 ENCOUNTER — HOSPITAL ENCOUNTER (EMERGENCY)
Age: 52
Discharge: HOME | End: 2022-07-06
Payer: MEDICAID

## 2022-07-06 VITALS
SYSTOLIC BLOOD PRESSURE: 116 MMHG | HEART RATE: 63 BPM | TEMPERATURE: 97.5 F | RESPIRATION RATE: 16 BRPM | WEIGHT: 180 LBS | BODY MASS INDEX: 27.37 KG/M2 | OXYGEN SATURATION: 98 % | DIASTOLIC BLOOD PRESSURE: 75 MMHG

## 2022-07-06 DIAGNOSIS — J45.909 UNCOMPLICATED ASTHMA, UNSPECIFIED ASTHMA SEVERITY, UNSPECIFIED WHETHER PERSISTENT: ICD-10-CM

## 2022-07-06 DIAGNOSIS — J06.9 UPPER RESPIRATORY TRACT INFECTION, UNSPECIFIED TYPE: Primary | ICD-10-CM

## 2022-07-06 DIAGNOSIS — E03.9 ACQUIRED HYPOTHYROIDISM: ICD-10-CM

## 2022-07-06 PROCEDURE — 99211 OFF/OP EST MAY X REQ PHY/QHP: CPT

## 2022-07-06 RX ORDER — GUAIFENESIN AND DEXTROMETHORPHAN HYDROBROMIDE 600; 30 MG/1; MG/1
TABLET, EXTENDED RELEASE ORAL
Qty: 28 TABLET | Refills: 0 | Status: SHIPPED | OUTPATIENT
Start: 2022-07-06

## 2022-07-06 NOTE — ED PROVIDER NOTES
Department of Emergency Medicine   [address] [place] [address]  Provider Note  Admit Date/RoomTime: 2022  2:18 PM  Room:   NAME: Sherri Mcdermott  : 1970  MRN: [id number]     Chief Complaint:  Nasal Congestion (cough, phlegmn, hx of asthma, started steroids )    History of Present Illness       Sherri Mcdermott is a 50 y.o. old male who presents to the urgent care center by private vehicle for patient. He said he has some nasal congestion and discharge and a slight cough he has a history of asthma his doctor started him on steroids but he thought he needed to check today because he feels he still has some phlegm. He is not complaining of fever, body aches, chest pain or shortness of breath. He has not been vaccinated for COVID. He is not complaining of sore throat or ear pain he said he just feels that he has phlegm in his chest at times. He said his friend went to the doctor yesterday and was told he had bronchitis so he thinks he might have bronchitis. ROS    Pertinent positives and negatives are stated within HPI, all other systems reviewed and are negative. Past Surgical History:   Procedure Laterality Date    APPENDECTOMY      KNEE ARTHROSCOPY Right 2021    ARTHROSCOPIC EXAM AND TREATMENT RIGHT KNEE performed by Narayan Barragan DO at [facility]      i-d staph infection leg  years ago    UMBILICAL HERNIA REPAIR N/A     OPEN UMBILICAL HERNIA  REPAIR performed by Kennedy Severin, MD at [address] [facility] History:  reports that he has been smoking. He has a 36.00 pack-year smoking history. He uses smokeless tobacco. He reports current alcohol use. He reports that he does not use drugs.   Family History: family history includes Cancer in his father and maternal grandfather; Heart Attack in his maternal grandmother and paternal grandfather; Heart Disease in his maternal grandfather; Heart Failure in his maternal grandmother and paternal grandmother; Hypothyroidism in his mother and sister. Allergies: Erythromycin and Penicillins    Physical Exam            ED Triage Vitals [07/06/22 1419]   BP Temp Temp src Heart Rate Resp SpO2 Height Weight   116/75 97.5 °F (36.4 °C) -- 63 16 98 % -- 180 lb (81.6 kg)      Oxygen Saturation Interpretation: Normal.    Constitutional:  Alert, development consistent with age. Ears:  External Ears: Bilateral normal.                 Nose:   There is no discharge, swelling or lesions noted. Sinuses: no Bilateral maxillary sinus tenderness. no Bilateral frontal sinus tenderness. Mouth:  normal tongue and buccal mucosa. Throat: no erythema or exudates noted. Teeth and gums normal..  Airway Patent. Neck/Lymphatics:  Neck Supple. Respiratory:   Breath sounds: Bilateral normal.  Lung sounds: normal.   CV:  Regular rate and rhythm, normal heart sounds, without pathological murmurs, ectopy, gallops, or rubs. GI:  Abdomen Soft, nontender, good bowel sounds. No firm or pulsatile mass. Integument:  Normal turgor. Warm, dry, without visible rash. Neurological:  Oriented. Motor functions intact. Lab / Imaging Results   (All laboratory and radiology results have been personally reviewed by myself)  Labs:  No results found for this visit on 07/06/22. Imaging: All Radiology results interpreted by Radiologist unless otherwise noted. No orders to display     ED Course / Medical Decision Making   Medications - No data to display       MDM:   Exam is totally negative his lungs are clear his sats 98% he does not have a fever there is nothing abnormal noted I do not hear any coughing. I wanted to do a COVID test however patient refused he said he knows he does not have COVID he does not want tested for COVID. He is already on prednisone that his doctor put him on for his asthma. I do not hear anything in his lungs.   He said he sometimes has some phlegm so I did order him some Mucinex DM to take to help expectorate any phlegm if he develops chest pain fever shortness of breath or worsening symptoms he should get reevaluated he has inhalers to use already. 1. Upper respiratory tract infection, unspecified type    2. Uncomplicated asthma, unspecified asthma severity, unspecified whether persistent      Plan   Discharge to home and advised to contact Mike Agu[facility] [id number] [phone]    Schedule an appointment as soon as possible for a visit      Patient condition is good    New Medications     New Prescriptions    DEXTROMETHORPHAN-GUAIFENESIN (MUCINEX DM)  MG TB12    Take as needed for congestion or cough     Electronically signed by PUNEET Kearney CNP   DD: 7/6/22  **This report was transcribed using voice recognition software. Every effort was made to ensure accuracy; however, inadvertent computerized transcription errors may be present.   END OF ED PROVIDER NOTE     PUNEET Kearney CNP  07/06/22 9637

## 2022-07-07 RX ORDER — LEVOTHYROXINE SODIUM 112 UG/1
TABLET ORAL
Qty: 90 TABLET | Refills: 1 | Status: SHIPPED | OUTPATIENT
Start: 2022-07-07

## 2022-07-28 DIAGNOSIS — J44.9 ASTHMA WITH COPD (HCC): ICD-10-CM

## 2022-07-29 RX ORDER — MONTELUKAST SODIUM 10 MG/1
10 TABLET ORAL NIGHTLY
Qty: 90 TABLET | Refills: 1 | Status: SHIPPED | OUTPATIENT
Start: 2022-07-29

## 2022-10-04 ENCOUNTER — HOSPITAL ENCOUNTER (OUTPATIENT)
Age: 52
Discharge: HOME | End: 2022-10-04
Payer: MEDICAID

## 2022-10-04 LAB
EKG ATRIAL RATE: 58 BPM
EKG P AXIS: 59 DEGREES
EKG P-R INTERVAL: 158 MS
EKG Q-T INTERVAL: 436 MS
EKG QRS DURATION: 98 MS
EKG QTC CALCULATION (BAZETT): 428 MS
EKG R AXIS: 11 DEGREES
EKG T AXIS: 37 DEGREES
EKG VENTRICULAR RATE: 58 BPM

## 2022-10-04 PROCEDURE — 93005 ELECTROCARDIOGRAM TRACING: CPT | Performed by: INTERNAL MEDICINE

## 2022-11-03 ENCOUNTER — TELEPHONE (OUTPATIENT)
Dept: FAMILY MEDICINE CLINIC | Age: 52
End: 2022-11-03

## 2022-11-03 NOTE — TELEPHONE ENCOUNTER
I rec'd a call from Deyanira at the [facility] ([facility]) at 8:18 am informing patient just called regarding his appt this morning at 8:15 am, and stated he forgot about the appt and will not be in. Deyanira was unable to remove appt (which would be a No Show), but did RS his appt to 11/7/22.      Last seen 5/3/2022  Next appt 11/7/2022

## 2022-11-07 ENCOUNTER — OFFICE VISIT (OUTPATIENT)
Dept: FAMILY MEDICINE CLINIC | Age: 52
End: 2022-11-07
Payer: MEDICAID

## 2022-11-07 VITALS
SYSTOLIC BLOOD PRESSURE: 134 MMHG | HEART RATE: 76 BPM | RESPIRATION RATE: 20 BRPM | BODY MASS INDEX: 25.16 KG/M2 | OXYGEN SATURATION: 97 % | DIASTOLIC BLOOD PRESSURE: 82 MMHG | HEIGHT: 68 IN | WEIGHT: 166 LBS

## 2022-11-07 DIAGNOSIS — E03.9 ACQUIRED HYPOTHYROIDISM: Primary | ICD-10-CM

## 2022-11-07 DIAGNOSIS — Z12.5 PROSTATE CANCER SCREENING: ICD-10-CM

## 2022-11-07 DIAGNOSIS — Z12.11 COLON CANCER SCREENING: ICD-10-CM

## 2022-11-07 PROCEDURE — G8427 DOCREV CUR MEDS BY ELIG CLIN: HCPCS | Performed by: FAMILY MEDICINE

## 2022-11-07 PROCEDURE — G8419 CALC BMI OUT NRM PARAM NOF/U: HCPCS | Performed by: FAMILY MEDICINE

## 2022-11-07 PROCEDURE — 4004F PT TOBACCO SCREEN RCVD TLK: CPT | Performed by: FAMILY MEDICINE

## 2022-11-07 PROCEDURE — G8484 FLU IMMUNIZE NO ADMIN: HCPCS | Performed by: FAMILY MEDICINE

## 2022-11-07 PROCEDURE — 99214 OFFICE O/P EST MOD 30 MIN: CPT | Performed by: FAMILY MEDICINE

## 2022-11-07 PROCEDURE — 3017F COLORECTAL CA SCREEN DOC REV: CPT | Performed by: FAMILY MEDICINE

## 2022-11-07 RX ORDER — LEVOTHYROXINE SODIUM 112 UG/1
112 TABLET ORAL DAILY
Qty: 90 TABLET | Refills: 1
Start: 2022-11-07

## 2022-11-07 SDOH — ECONOMIC STABILITY: FOOD INSECURITY: WITHIN THE PAST 12 MONTHS, YOU WORRIED THAT YOUR FOOD WOULD RUN OUT BEFORE YOU GOT MONEY TO BUY MORE.: NEVER TRUE

## 2022-11-07 SDOH — ECONOMIC STABILITY: FOOD INSECURITY: WITHIN THE PAST 12 MONTHS, THE FOOD YOU BOUGHT JUST DIDN'T LAST AND YOU DIDN'T HAVE MONEY TO GET MORE.: NEVER TRUE

## 2022-11-07 ASSESSMENT — ENCOUNTER SYMPTOMS
SHORTNESS OF BREATH: 1
NAUSEA: 0
VOMITING: 0
DIARRHEA: 0

## 2022-11-07 ASSESSMENT — LIFESTYLE VARIABLES: HOW OFTEN DO YOU HAVE A DRINK CONTAINING ALCOHOL: MONTHLY OR LESS

## 2022-11-07 NOTE — PROGRESS NOTES
Chief Complaint   Patient presents with    Hypothyroidism       Hypothyroidism:  Patient is here today to follow up chronic hypothyroidism. This is   generally controlled on current medication regimen. Takes medication as directed and tolerates well. Symptoms from thyroid standpoint include none. Thyroid function in particular is  controlled. Patient's past medical, surgical, social and/orfamily history reviewed, updated in chart, and are non-contributory (unless otherwise stated). Medications and allergies also reviewed and updated in chart. /82   Pulse 76   Resp 20   Ht 5' 8\" (1.727 m)   Wt 166 lb (75.3 kg)   SpO2 97%   BMI 25.24 kg/m²     Review of Systems   Eyes:  Positive for visual disturbance (chronic blurry vision). Respiratory:  Positive for shortness of breath (with exertion). Cardiovascular:  Negative for chest pain, palpitations and leg swelling. Gastrointestinal:  Negative for diarrhea, nausea and vomiting. Genitourinary:  Negative for difficulty urinating, dysuria and frequency. Skin:  Negative for rash. Psychiatric/Behavioral:  Negative for dysphoric mood.       Current Outpatient Medications   Medication Sig Dispense Refill    levothyroxine (SYNTHROID) 112 MCG tablet Take 1 tablet by mouth Daily 90 tablet 1    montelukast (SINGULAIR) 10 MG tablet take 1 tablet by mouth nightly 90 tablet 1    omeprazole (PRILOSEC) 40 MG delayed release capsule Take 40 mg by mouth nightly      famotidine (PEPCID) 20 MG tablet take 1 tablet by mouth twice a day if needed for ACID REFLUX 60 tablet 3    mometasone-formoterol (DULERA) 200-5 MCG/ACT inhaler Inhale 2 puffs into the lungs every 12 hours 13 g 3    predniSONE (DELTASONE) 20 MG tablet take 2 tablets by mouth daily for 5 days if needed for ASTHMA FLARE UP 10 tablet 3    albuterol (PROVENTIL) (2.5 MG/3ML) 0.083% nebulizer solution inhale contents of 1 vial ( 3 milliliters ) in nebulizer by mouth and INTO THE LUNGS every 6 hours if needed      albuterol sulfate  (90 Base) MCG/ACT inhaler Inhale 2 puffs into the lungs every 6 hours as needed for Wheezing       No current facility-administered medications for this visit. Physical Exam  Vitals reviewed. Constitutional:       Appearance: He is well-developed. Neck:      Thyroid: No thyromegaly. Cardiovascular:      Rate and Rhythm: Normal rate and regular rhythm. Heart sounds: Normal heart sounds. No murmur heard. No friction rub. Pulmonary:      Effort: Pulmonary effort is normal. No respiratory distress. Breath sounds: Normal breath sounds. No wheezing or rales. Abdominal:      General: Bowel sounds are normal. There is no distension. Palpations: Abdomen is soft. Tenderness: There is no abdominal tenderness. There is no guarding or rebound. Musculoskeletal:      Cervical back: Neck supple. Skin:     General: Skin is warm and dry. Neurological:      Mental Status: He is alert. Merline Justice was seen today for hypothyroidism. Diagnoses and all orders for this visit:    Acquired hypothyroidism  -     CBC; Future  -     Comprehensive Metabolic Panel; Future  -     Lipid Panel; Future  -     TSH; Future  -     levothyroxine (SYNTHROID) 112 MCG tablet; Take 1 tablet by mouth Daily    Colon cancer screening  -     Fecal DNA Colorectal cancer screening (Cologuard)    Prostate cancer screening  -     PSA Screening; Future        Return in about 6 months (around 5/7/2023) for thyroid. , or sooner if necessary. Educational materials and/or home exercises printed for patient's review and were included in patientinstructions on his/her After Visit Summary and given to patient at the end of visit. Counseled regarding above diagnosis, including possible risks and complications,  especially ifleft uncontrolled.     Counseled regarding the possible side effects, risks, benefits and alternatives to treatment; patient and/or guardian verbalizes understanding, agrees, feels comfortable with and wishes to proceedwith above treatment plan. Advised patient to call with any new medication issues, and read all Rx info from pharmacy to assure aware of all possible risks and side effects of medication before taking. Reviewed age and gender appropriate health screening exams and vaccinations. Advised patient regarding importance of keeping up with recommended health maintenance and to schedule as soon as possible if overdue, as this isimportant in assessing for undiagnosed pathology, especially cancer, as well as protecting against potentially harmful/life threatening disease. Patient and/or guardian verbalizes understanding and agrees [place] counseling, assessment and plan. All questions answered.

## 2022-12-20 DIAGNOSIS — E03.9 ACQUIRED HYPOTHYROIDISM: ICD-10-CM

## 2022-12-21 RX ORDER — LEVOTHYROXINE SODIUM 112 UG/1
TABLET ORAL
Qty: 90 TABLET | Refills: 1 | Status: SHIPPED | OUTPATIENT
Start: 2022-12-21

## 2023-01-16 DIAGNOSIS — J44.9 ASTHMA WITH COPD (HCC): ICD-10-CM

## 2023-01-19 RX ORDER — MONTELUKAST SODIUM 10 MG/1
10 TABLET ORAL NIGHTLY
Qty: 90 TABLET | Refills: 1 | Status: SHIPPED | OUTPATIENT
Start: 2023-01-19

## 2023-03-09 ENCOUNTER — OFFICE VISIT (OUTPATIENT)
Dept: FAMILY MEDICINE CLINIC | Age: 53
End: 2023-03-09
Payer: MEDICAID

## 2023-03-09 VITALS
HEART RATE: 70 BPM | OXYGEN SATURATION: 95 % | HEIGHT: 68 IN | DIASTOLIC BLOOD PRESSURE: 84 MMHG | RESPIRATION RATE: 20 BRPM | BODY MASS INDEX: 26.67 KG/M2 | SYSTOLIC BLOOD PRESSURE: 132 MMHG | WEIGHT: 176 LBS

## 2023-03-09 DIAGNOSIS — J01.00 ACUTE NON-RECURRENT MAXILLARY SINUSITIS: Primary | ICD-10-CM

## 2023-03-09 DIAGNOSIS — H93.13 TINNITUS OF BOTH EARS: ICD-10-CM

## 2023-03-09 DIAGNOSIS — E03.9 ACQUIRED HYPOTHYROIDISM: ICD-10-CM

## 2023-03-09 PROCEDURE — G8419 CALC BMI OUT NRM PARAM NOF/U: HCPCS | Performed by: FAMILY MEDICINE

## 2023-03-09 PROCEDURE — 4004F PT TOBACCO SCREEN RCVD TLK: CPT | Performed by: FAMILY MEDICINE

## 2023-03-09 PROCEDURE — 3017F COLORECTAL CA SCREEN DOC REV: CPT | Performed by: FAMILY MEDICINE

## 2023-03-09 PROCEDURE — 99214 OFFICE O/P EST MOD 30 MIN: CPT | Performed by: FAMILY MEDICINE

## 2023-03-09 PROCEDURE — G8427 DOCREV CUR MEDS BY ELIG CLIN: HCPCS | Performed by: FAMILY MEDICINE

## 2023-03-09 PROCEDURE — G8484 FLU IMMUNIZE NO ADMIN: HCPCS | Performed by: FAMILY MEDICINE

## 2023-03-09 RX ORDER — AZITHROMYCIN 250 MG/1
TABLET, FILM COATED ORAL
Qty: 6 TABLET | Refills: 0 | Status: SHIPPED | OUTPATIENT
Start: 2023-03-09

## 2023-03-09 RX ORDER — PREDNISONE 20 MG/1
40 TABLET ORAL DAILY
Qty: 10 TABLET | Refills: 0 | Status: SHIPPED | OUTPATIENT
Start: 2023-03-09 | End: 2023-03-14

## 2023-03-09 SDOH — ECONOMIC STABILITY: FOOD INSECURITY: WITHIN THE PAST 12 MONTHS, THE FOOD YOU BOUGHT JUST DIDN'T LAST AND YOU DIDN'T HAVE MONEY TO GET MORE.: PATIENT DECLINED

## 2023-03-09 SDOH — ECONOMIC STABILITY: HOUSING INSECURITY
IN THE LAST 12 MONTHS, WAS THERE A TIME WHEN YOU DID NOT HAVE A STEADY PLACE TO SLEEP OR SLEPT IN A SHELTER (INCLUDING NOW)?: PATIENT REFUSED

## 2023-03-09 SDOH — ECONOMIC STABILITY: INCOME INSECURITY: HOW HARD IS IT FOR YOU TO PAY FOR THE VERY BASICS LIKE FOOD, HOUSING, MEDICAL CARE, AND HEATING?: PATIENT DECLINED

## 2023-03-09 SDOH — ECONOMIC STABILITY: FOOD INSECURITY: WITHIN THE PAST 12 MONTHS, YOU WORRIED THAT YOUR FOOD WOULD RUN OUT BEFORE YOU GOT MONEY TO BUY MORE.: PATIENT DECLINED

## 2023-03-09 ASSESSMENT — ENCOUNTER SYMPTOMS
COUGH: 1
SINUS COMPLAINT: 1
SINUS PRESSURE: 1
SHORTNESS OF BREATH: 0
SORE THROAT: 0

## 2023-03-09 ASSESSMENT — PATIENT HEALTH QUESTIONNAIRE - PHQ9
SUM OF ALL RESPONSES TO PHQ QUESTIONS 1-9: 0
1. LITTLE INTEREST OR PLEASURE IN DOING THINGS: 0
SUM OF ALL RESPONSES TO PHQ QUESTIONS 1-9: 0
2. FEELING DOWN, DEPRESSED OR HOPELESS: 0
SUM OF ALL RESPONSES TO PHQ9 QUESTIONS 1 & 2: 0
SUM OF ALL RESPONSES TO PHQ QUESTIONS 1-9: 0
SUM OF ALL RESPONSES TO PHQ QUESTIONS 1-9: 0

## 2023-03-09 NOTE — PROGRESS NOTES
[address] [facility]   [address] [facility]   Layla Reyes MD     Patient: Deanna aCrson Birth: 1970  Visit Date: 3/9/23    Kendrick Maynard is a 50y.o. year old male here today for   Chief Complaint   Patient presents with    Sinus Problem    Tinnitus     Feel plugged, just back from [facility]  Sinus Problem  This is a recurrent problem. The current episode started in the past 7 days. The problem has been rapidly worsening since onset. There has been no fever. Associated symptoms include coughing (due to reflux) and sinus pressure. Pertinent negatives include no chills, ear pain, shortness of breath or sore throat. Patient has had tinnitus in both ears for several weeks. No similar symptoms in the past. Symptoms are constant. Review of Systems   Constitutional:  Negative for chills. HENT:  Positive for hearing loss (yesterday), sinus pressure and tinnitus (for past month). Negative for ear pain and sore throat. Respiratory:  Positive for cough (due to reflux). Negative for shortness of breath. Past medical, surgical, social and/or family historyreviewed, updated as needed, and are non-contributory (unless otherwise stated). Medications, allergies, and problem list also reviewed and updated as needed in patient's record.      Current Outpatient Medications   Medication Sig Dispense Refill    predniSONE (DELTASONE) 20 MG tablet Take 2 tablets by mouth daily for 5 days 10 tablet 0    azithromycin (ZITHROMAX) 250 MG tablet 500 mg on day 1 then 250 mg po daily for 4 more days 6 tablet 0    montelukast (SINGULAIR) 10 MG tablet take 1 tablet by mouth nightly 90 tablet 1    levothyroxine (SYNTHROID) 112 MCG tablet take 1 tablet by mouth once daily 90 tablet 1    omeprazole (PRILOSEC) 40 MG delayed release capsule Take 40 mg by mouth nightly      mometasone-formoterol (DULERA) 200-5 MCG/ACT inhaler Inhale 2 puffs into the lungs every 12 hours 13 g 3    albuterol (PROVENTIL) (2.5 MG/3ML) 0.083% nebulizer solution inhale contents of 1 vial ( 3 milliliters ) in nebulizer by mouth and INTO THE LUNGS every 6 hours if needed      albuterol sulfate  (90 Base) MCG/ACT inhaler Inhale 2 puffs into the lungs every 6 hours as needed for Wheezing       No current facility-administered medications for this visit. Wt Readings from Last 3 Encounters:   03/09/23 176 lb (79.8 kg)   11/07/22 166 lb (75.3 kg)   07/06/22 180 lb (81.6 kg)                   Vitals:    03/09/23 1156   BP: 132/84   Pulse: 70   Resp: 20   SpO2: 95%       Physical Exam  Vitals reviewed. Constitutional:       Appearance: He is well-developed. HENT:      Right Ear: A middle ear effusion is present. Left Ear: A middle ear effusion is present. Nose: No mucosal edema. Right Turbinates: Swollen. Left Turbinates: Swollen. Mouth/Throat:      Pharynx: Posterior oropharyngeal erythema present. Cardiovascular:      Rate and Rhythm: Normal rate and regular rhythm. Heart sounds: Normal heart sounds. No murmur heard. No friction rub. No gallop. Pulmonary:      Effort: Pulmonary effort is normal. No respiratory distress. Breath sounds: No wheezing. Musculoskeletal:      Cervical back: Neck supple. Skin:     General: Skin is warm and dry. Neurological:      Mental Status: He is alert and oriented to person, place, and time. ASSESSMENT/PLAN  Abbi Perry was seen today for sinus problem and tinnitus. Diagnoses and all orders for this visit:    Acute non-recurrent maxillary sinusitis  -     predniSONE (DELTASONE) 20 MG tablet;  Take 2 tablets by mouth daily for 5 days  -     azithromycin (ZITHROMAX) 250 MG tablet; 500 mg on day 1 then 250 mg po daily for 4 more days    Tinnitus of both ears  -     [address] [facility]te, Audiology, Terrence Cancino    Acquired hypothyroidism        - CMP, CBC, TSH, and lipid panel; future      /MyChart follow up if tests abnormal.    Return for scheduled appointment. or sooner if necessary.            I have reviewed my findings and recommendations with Levar Torres MD, M.D

## 2023-04-28 ENCOUNTER — OFFICE VISIT (OUTPATIENT)
Dept: ENT CLINIC | Age: 53
End: 2023-04-28
Payer: MEDICAID

## 2023-04-28 ENCOUNTER — PROCEDURE VISIT (OUTPATIENT)
Dept: AUDIOLOGY | Age: 53
End: 2023-04-28
Payer: MEDICAID

## 2023-04-28 VITALS
BODY MASS INDEX: 25.84 KG/M2 | DIASTOLIC BLOOD PRESSURE: 81 MMHG | WEIGHT: 170.5 LBS | HEART RATE: 76 BPM | SYSTOLIC BLOOD PRESSURE: 115 MMHG | HEIGHT: 68 IN

## 2023-04-28 DIAGNOSIS — H93.13 TINNITUS OF BOTH EARS: ICD-10-CM

## 2023-04-28 DIAGNOSIS — H93.13 TINNITUS OF BOTH EARS: Primary | ICD-10-CM

## 2023-04-28 DIAGNOSIS — H90.3 SENSORINEURAL HEARING LOSS, BILATERAL: ICD-10-CM

## 2023-04-28 DIAGNOSIS — H90.42 SENSORINEURAL HEARING LOSS (SNHL) OF LEFT EAR WITH UNRESTRICTED HEARING OF RIGHT EAR: ICD-10-CM

## 2023-04-28 DIAGNOSIS — R42 DISEQUILIBRIUM: Primary | ICD-10-CM

## 2023-04-28 DIAGNOSIS — H91.8X9 ASYMMETRICAL HEARING LOSS: ICD-10-CM

## 2023-04-28 PROCEDURE — 92557 COMPREHENSIVE HEARING TEST: CPT | Performed by: AUDIOLOGIST

## 2023-04-28 PROCEDURE — 3017F COLORECTAL CA SCREEN DOC REV: CPT | Performed by: NURSE PRACTITIONER

## 2023-04-28 PROCEDURE — G8419 CALC BMI OUT NRM PARAM NOF/U: HCPCS | Performed by: NURSE PRACTITIONER

## 2023-04-28 PROCEDURE — G8427 DOCREV CUR MEDS BY ELIG CLIN: HCPCS | Performed by: NURSE PRACTITIONER

## 2023-04-28 PROCEDURE — 4004F PT TOBACCO SCREEN RCVD TLK: CPT | Performed by: NURSE PRACTITIONER

## 2023-04-28 PROCEDURE — 92567 TYMPANOMETRY: CPT | Performed by: AUDIOLOGIST

## 2023-04-28 PROCEDURE — 99204 OFFICE O/P NEW MOD 45 MIN: CPT | Performed by: NURSE PRACTITIONER

## 2023-04-28 ASSESSMENT — ENCOUNTER SYMPTOMS
EYES NEGATIVE: 1
RHINORRHEA: 1
SINUS PAIN: 0
STRIDOR: 0
SHORTNESS OF BREATH: 0
RESPIRATORY NEGATIVE: 1

## 2023-04-28 NOTE — PROGRESS NOTES
the bilaterally x none     Recheck was not done     Ildefonso Willa was seen today for new patient. Diagnoses and all orders for this visit:    Disequilibrium  -     Cancel: MRI IAC POSTERIOR FOSSA W WO CONTRAST; Future  -     MRI IAC POSTERIOR FOSSA W WO CONTRAST; Future    Asymmetrical hearing loss    Sensorineural hearing loss (SNHL) of left ear with unrestricted hearing of right ear        Carpenter-Hallpike maneuver was performed on the patient and found to be negative bilaterally. At this time due to his asymmetrical hearing loss and disequilibrium symptoms with tinnitus and MRI of the IAC posterior fossa with and without contrast will be ordered for further evaluation of the internal auditory canals. If this returns negative we will discuss with patient and order VNG in the future however want to rule out all structural possibilities first.  He agrees with this plan. He will continue with his current Flonase, 2 sprays each nostril once daily with Singulair, 10 mg once daily and his over-the-counter allergy pill as recommended by his pulmonologist.  He will follow-up in 1 month for reevaluation and results. He is instructed to call with any new or worsening symptoms prior to his next appointment.     Merlin Fournier, MSN, FNP-C  [facility], Nose and Throat    The information contained in this note has been dictated using drug and medical speech recognition software and may contain errors

## 2023-04-28 NOTE — PROGRESS NOTES
This patient was referred for audiometric and tympanometric testing by GALEN Salas due to tinnitus. Audiometry using pure tone air and bone conduction testing revealed hearing sensitivity within normal limits through 3000 Hz in the right ear, sloping to a moderate  sensorineural hearing loss. The left ear revealed hearing sensitivity within normal limits through 1000 Hz, sloping to a severe sensorineural hearing loss. Reliability was good. Speech reception thresholds were in good agreement with the pure tone averages, bilaterally. Speech discrimination scores were good, bilaterally. Asymmetrical results obtained: Left ear worse. Tympanometry revealed normal middle ear peak pressure and compliance, bilaterally. Note: Compliance is shallow in the right as compared to the left, however still within normal limits. The results were reviewed with the patient. Recommendations for follow up will be made pending physician consult.     Electronically signed by Aron Siddiqi on 4/28/2023 at 6:54 PM

## 2023-05-08 ENCOUNTER — HOSPITAL ENCOUNTER (OUTPATIENT)
Dept: MRI IMAGING | Age: 53
Discharge: HOME | End: 2023-05-10
Payer: MEDICAID

## 2023-05-08 DIAGNOSIS — R42 DISEQUILIBRIUM: ICD-10-CM

## 2023-05-08 PROCEDURE — 70553 MRI BRAIN STEM W/O & W/DYE: CPT

## 2023-05-08 PROCEDURE — A9577 INJ MULTIHANCE: HCPCS | Performed by: RADIOLOGY

## 2023-05-08 PROCEDURE — 6360000004 HC RX CONTRAST MEDICATION: Performed by: RADIOLOGY

## 2023-05-08 RX ADMIN — GADOBENATE DIMEGLUMINE 16 ML: 529 INJECTION, SOLUTION INTRAVENOUS at 08:59

## 2023-06-04 DIAGNOSIS — E03.9 ACQUIRED HYPOTHYROIDISM: ICD-10-CM

## 2023-06-06 RX ORDER — LEVOTHYROXINE SODIUM 112 UG/1
TABLET ORAL
Qty: 90 TABLET | Refills: 1 | Status: SHIPPED
Start: 2023-06-06 | End: 2023-06-07

## 2023-06-07 ENCOUNTER — OFFICE VISIT (OUTPATIENT)
Dept: FAMILY MEDICINE CLINIC | Age: 53
End: 2023-06-07
Payer: MEDICAID

## 2023-06-07 VITALS
SYSTOLIC BLOOD PRESSURE: 126 MMHG | BODY MASS INDEX: 26.07 KG/M2 | RESPIRATION RATE: 20 BRPM | WEIGHT: 172 LBS | DIASTOLIC BLOOD PRESSURE: 78 MMHG | HEART RATE: 78 BPM | HEIGHT: 68 IN | OXYGEN SATURATION: 97 %

## 2023-06-07 DIAGNOSIS — E03.9 ACQUIRED HYPOTHYROIDISM: Primary | ICD-10-CM

## 2023-06-07 DIAGNOSIS — B36.0 TINEA VERSICOLOR: ICD-10-CM

## 2023-06-07 DIAGNOSIS — J44.9 ASTHMA WITH COPD (HCC): ICD-10-CM

## 2023-06-07 DIAGNOSIS — Z12.5 PROSTATE CANCER SCREENING: ICD-10-CM

## 2023-06-07 PROCEDURE — 3017F COLORECTAL CA SCREEN DOC REV: CPT | Performed by: FAMILY MEDICINE

## 2023-06-07 PROCEDURE — 99214 OFFICE O/P EST MOD 30 MIN: CPT | Performed by: FAMILY MEDICINE

## 2023-06-07 PROCEDURE — 4004F PT TOBACCO SCREEN RCVD TLK: CPT | Performed by: FAMILY MEDICINE

## 2023-06-07 PROCEDURE — 3023F SPIROM DOC REV: CPT | Performed by: FAMILY MEDICINE

## 2023-06-07 PROCEDURE — G8427 DOCREV CUR MEDS BY ELIG CLIN: HCPCS | Performed by: FAMILY MEDICINE

## 2023-06-07 PROCEDURE — G8419 CALC BMI OUT NRM PARAM NOF/U: HCPCS | Performed by: FAMILY MEDICINE

## 2023-06-07 RX ORDER — KETOCONAZOLE 20 MG/ML
SHAMPOO TOPICAL
COMMUNITY
Start: 2023-06-07

## 2023-06-07 RX ORDER — LEVOTHYROXINE SODIUM 112 UG/1
112 TABLET ORAL DAILY
Qty: 90 TABLET | Refills: 1
Start: 2023-06-07

## 2023-06-07 RX ORDER — MONTELUKAST SODIUM 10 MG/1
10 TABLET ORAL NIGHTLY
Qty: 90 TABLET | Refills: 1 | Status: SHIPPED | OUTPATIENT
Start: 2023-06-07

## 2023-06-07 ASSESSMENT — ENCOUNTER SYMPTOMS
NAUSEA: 1
VOMITING: 0
SHORTNESS OF BREATH: 1
DIARRHEA: 0

## 2023-06-07 NOTE — PROGRESS NOTES
Chief Complaint   Patient presents with    Hypothyroidism       Hypothyroidism:  Patient is here today to follow up chronic hypothyroidism. This is generally controlled on current medication regimen. Takes medication as directed and tolerates well. Symptoms from thyroid standpoint include fatigue. Thyroid function in particular is not controlled. Patient has noticed numbness and tingling in feet upon waking up. Patient also has intermittent ankle pain. Patient has had shortness of breath with weather changes due to asthma. Taking daily  asthma medication as prescribed. Patient's past medical, surgical, social and/orfamily history reviewed, updated in chart, and are non-contributory (unless otherwise stated). Medications and allergies also reviewed and updated in chart. /78   Pulse 78   Resp 20   Ht 5' 8\" (1.727 m)   Wt 172 lb (78 kg)   SpO2 97%   BMI 26.15 kg/m²     Review of Systems   Eyes:  Positive for visual disturbance (with reading). Respiratory:  Positive for shortness of breath (with exertion). Cardiovascular:  Negative for chest pain, palpitations and leg swelling. Gastrointestinal:  Positive for nausea (rarely). Negative for diarrhea and vomiting. Skin:  Positive for rash (upper arms tinea versicolor). Psychiatric/Behavioral:  Negative for dysphoric mood. Current Outpatient Medications   Medication Sig Dispense Refill    montelukast (SINGULAIR) 10 MG tablet Take 1 tablet by mouth nightly 90 tablet 1    ketoconazole (NIZORAL) 2 % shampoo Apply topically daily as needed.       levothyroxine (SYNTHROID) 112 MCG tablet Take 1 tablet by mouth daily 90 tablet 1    omeprazole (PRILOSEC) 40 MG delayed release capsule Take 1 capsule by mouth nightly      mometasone-formoterol (DULERA) 200-5 MCG/ACT inhaler Inhale 2 puffs into the lungs every 12 hours 13 g 3    albuterol (PROVENTIL) (2.5 MG/3ML) 0.083% nebulizer solution inhale contents of 1 vial ( 3 milliliters ) in

## 2023-06-08 ENCOUNTER — OFFICE VISIT (OUTPATIENT)
Dept: ENT CLINIC | Age: 53
End: 2023-06-08
Payer: MEDICAID

## 2023-06-08 VITALS
WEIGHT: 172 LBS | DIASTOLIC BLOOD PRESSURE: 78 MMHG | BODY MASS INDEX: 26.07 KG/M2 | HEIGHT: 68 IN | SYSTOLIC BLOOD PRESSURE: 124 MMHG | HEART RATE: 70 BPM

## 2023-06-08 DIAGNOSIS — Z12.5 PROSTATE CANCER SCREENING: ICD-10-CM

## 2023-06-08 DIAGNOSIS — H93.13 TINNITUS OF BOTH EARS: ICD-10-CM

## 2023-06-08 DIAGNOSIS — E03.9 ACQUIRED HYPOTHYROIDISM: ICD-10-CM

## 2023-06-08 DIAGNOSIS — H91.8X9 ASYMMETRICAL HEARING LOSS: Primary | ICD-10-CM

## 2023-06-08 DIAGNOSIS — H90.42 SENSORINEURAL HEARING LOSS (SNHL) OF LEFT EAR WITH UNRESTRICTED HEARING OF RIGHT EAR: ICD-10-CM

## 2023-06-08 DIAGNOSIS — R42 DISEQUILIBRIUM: ICD-10-CM

## 2023-06-08 LAB
ALBUMIN SERPL-MCNC: 4.4 G/DL (ref 3.5–5.2)
ALP SERPL-CCNC: 87 U/L (ref 40–129)
ALT SERPL-CCNC: 11 U/L (ref 0–40)
ANION GAP SERPL CALCULATED.3IONS-SCNC: 13 MMOL/L (ref 7–16)
AST SERPL-CCNC: 13 U/L (ref 0–39)
BILIRUB SERPL-MCNC: 0.4 MG/DL (ref 0–1.2)
BUN SERPL-MCNC: 21 MG/DL (ref 6–20)
CALCIUM SERPL-MCNC: 9.8 MG/DL (ref 8.6–10.2)
CHLORIDE SERPL-SCNC: 105 MMOL/L (ref 98–107)
CHOLESTEROL, TOTAL: 229 MG/DL (ref 0–199)
CO2 SERPL-SCNC: 24 MMOL/L (ref 22–29)
CREAT SERPL-MCNC: 1.2 MG/DL (ref 0.7–1.2)
ERYTHROCYTE [DISTWIDTH] IN BLOOD BY AUTOMATED COUNT: 13.3 FL (ref 11.5–15)
GLUCOSE SERPL-MCNC: 97 MG/DL (ref 74–99)
HCT VFR BLD AUTO: 51.5 % (ref 37–54)
HDLC SERPL-MCNC: 37 MG/DL
HGB BLD-MCNC: 16.2 G/DL (ref 12.5–16.5)
LDLC SERPL CALC-MCNC: 175 MG/DL (ref 0–99)
MCH RBC QN AUTO: 29.7 PG (ref 26–35)
MCHC RBC AUTO-ENTMCNC: 31.5 % (ref 32–34.5)
MCV RBC AUTO: 94.3 FL (ref 80–99.9)
PLATELET # BLD AUTO: 285 E9/L (ref 130–450)
PMV BLD AUTO: 10.4 FL (ref 7–12)
POTASSIUM SERPL-SCNC: 4.8 MMOL/L (ref 3.5–5)
PROT SERPL-MCNC: 6.9 G/DL (ref 6.4–8.3)
PSA SERPL-MCNC: 0.24 NG/ML (ref 0–4)
RBC # BLD AUTO: 5.46 E12/L (ref 3.8–5.8)
SODIUM SERPL-SCNC: 142 MMOL/L (ref 132–146)
TRIGL SERPL-MCNC: 86 MG/DL (ref 0–149)
TSH SERPL-MCNC: 12.13 UIU/ML (ref 0.27–4.2)
VLDLC SERPL CALC-MCNC: 17 MG/DL
WBC # BLD: 10.6 E9/L (ref 4.5–11.5)

## 2023-06-08 PROCEDURE — 99213 OFFICE O/P EST LOW 20 MIN: CPT | Performed by: NURSE PRACTITIONER

## 2023-06-08 PROCEDURE — 3017F COLORECTAL CA SCREEN DOC REV: CPT | Performed by: NURSE PRACTITIONER

## 2023-06-08 PROCEDURE — G8427 DOCREV CUR MEDS BY ELIG CLIN: HCPCS | Performed by: NURSE PRACTITIONER

## 2023-06-08 PROCEDURE — G8419 CALC BMI OUT NRM PARAM NOF/U: HCPCS | Performed by: NURSE PRACTITIONER

## 2023-06-08 PROCEDURE — 4004F PT TOBACCO SCREEN RCVD TLK: CPT | Performed by: NURSE PRACTITIONER

## 2023-06-08 RX ORDER — AZELASTINE 1 MG/ML
1-2 SPRAY, METERED NASAL 2 TIMES DAILY PRN
Qty: 30 ML | Refills: 1 | Status: SHIPPED | OUTPATIENT
Start: 2023-06-08

## 2023-06-08 ASSESSMENT — ENCOUNTER SYMPTOMS
RHINORRHEA: 0
SINUS PAIN: 0
EYES NEGATIVE: 1
STRIDOR: 0
RESPIRATORY NEGATIVE: 1
SHORTNESS OF BREATH: 0

## 2023-06-08 NOTE — PROGRESS NOTES
[facility]  Dr. Judge Rodger Cee. Ms.Ed        Patient Name:  Lawrence Lebron  :  1970     CHIEF C/O:    Chief Complaint   Patient presents with    Follow-up     MRI results       HISTORY OBTAINED FROM:  patient    HISTORY OF PRESENT ILLNESS:       Layla Degroot is a 48y.o. year old male, here today for follow up of:       Asymmetrical hearing loss, disequilibrium, and MRI results. Patient was last seen 6 weeks ago and underwent an MRI of the IACs which was normal.  Patient states no noticeable changes to his hearing with mild decrease in left ear compared to the right. The tinnitus continues in the left ear and is still noticeable despite all attempts to mask with other sounds. He denies any ear pain or pressure. He states the disequilibrium symptoms have continued to improve with only periodic episodes that are less severe and shorter in duration. He states he is using his Flonase as needed but not consistently daily. He continues to have some congestion with postnasal drainage symptoms that seem to be worse in the morning. He has no new complaint at this time.          Past Medical History:   Diagnosis Date    Asthma     COPD (chronic obstructive pulmonary disease) ([facility] [address]     Emphysema (subcutaneous) (surgical) resulting from a procedure     Emphysema lung (HCC)     Hypothyroidism     Sleep apnea     cant wear cpap    Umbilical hernia      Past Surgical History:   Procedure Laterality Date    APPENDECTOMY      KNEE ARTHROSCOPY Right 2021    ARTHROSCOPIC EXAM AND TREATMENT RIGHT KNEE performed by Jose Stein DO at [address] staph infection leg  years ago    UMBILICAL HERNIA REPAIR N/A 2099    OPEN UMBILICAL HERNIA  REPAIR performed by Ondina Scherer MD at [address]       Current Outpatient Medications:     azelastine (ASTELIN) 0.1 % nasal spray, 1-2 sprays by Nasal route 2 times daily as needed for Rhinitis Use in each nostril as directed, Disp:

## 2023-11-20 DIAGNOSIS — E03.9 ACQUIRED HYPOTHYROIDISM: ICD-10-CM

## 2023-11-20 RX ORDER — LEVOTHYROXINE SODIUM 112 UG/1
TABLET ORAL
Qty: 90 TABLET | Refills: 1 | OUTPATIENT
Start: 2023-11-20

## 2023-11-20 RX ORDER — LEVOTHYROXINE SODIUM 0.12 MG/1
125 TABLET ORAL DAILY
Qty: 90 TABLET | Refills: 0 | Status: SHIPPED | OUTPATIENT
Start: 2023-11-20

## 2023-12-14 DIAGNOSIS — J44.89 ASTHMA WITH COPD: ICD-10-CM

## 2023-12-14 RX ORDER — MONTELUKAST SODIUM 10 MG/1
10 TABLET ORAL NIGHTLY
Qty: 90 TABLET | Refills: 1 | Status: SHIPPED | OUTPATIENT
Start: 2023-12-14

## 2023-12-27 ENCOUNTER — TELEPHONE (OUTPATIENT)
Dept: FAMILY MEDICINE CLINIC | Age: 53
End: 2023-12-27

## 2023-12-27 DIAGNOSIS — J01.00 ACUTE NON-RECURRENT MAXILLARY SINUSITIS: ICD-10-CM

## 2023-12-27 RX ORDER — PREDNISONE 20 MG/1
40 TABLET ORAL DAILY
Qty: 10 TABLET | Refills: 0 | Status: SHIPPED | OUTPATIENT
Start: 2023-12-27 | End: 2024-01-01

## 2023-12-27 NOTE — TELEPHONE ENCOUNTER
Called pt and pt said he keeps the prednisone on hand for his lungs and he wants to have it.   Last seen 6/7/2023  Next appt 1/9/2024

## 2023-12-27 NOTE — TELEPHONE ENCOUNTER
----- Message from [address] sent at 12/27/2023  1:50 PM EST -----  Subject: Refill Request    QUESTIONS  Name of Medication? predniSONE (DELTASONE) 20 MG tablet  Patient-reported dosage and instructions? 20 mg. Take 3 tablets by mouth   daily for 3 days, then 2.5 by mouth daily for 3 days, then 2 tablets daily   for 3 days. etc.  How many days do you have left? 0  Preferred Pharmacy? [address] [place], [place] [address] #[id number]  Pharmacy phone number (if available)? [phone]  Additional Information for Provider? Wife states he has been having   \"trouble\" for few days. Please f/u for further details on instruction and   symptoms. ---------------------------------------------------------------------------  --------------  real trends INFO  What is the best way for the office to contact you? OK to leave message on   voicemail  Preferred Call Back Phone Number? [phone]  ---------------------------------------------------------------------------  --------------  SCRIPT ANSWERS  Relationship to Patient? Spouse/Partner  Representative Name? Brii Roberto  Is the representative on the Communication Release of Information (REBEKAH)   form in Epic?  Yes

## 2024-01-09 ENCOUNTER — OFFICE VISIT (OUTPATIENT)
Dept: FAMILY MEDICINE CLINIC | Age: 54
End: 2024-01-09

## 2024-01-09 VITALS
HEART RATE: 97 BPM | SYSTOLIC BLOOD PRESSURE: 132 MMHG | DIASTOLIC BLOOD PRESSURE: 80 MMHG | RESPIRATION RATE: 20 BRPM | BODY MASS INDEX: 26.07 KG/M2 | HEIGHT: 68 IN | OXYGEN SATURATION: 97 % | WEIGHT: 172 LBS

## 2024-01-09 DIAGNOSIS — G89.29 CHRONIC LEFT-SIDED LOW BACK PAIN WITHOUT SCIATICA: ICD-10-CM

## 2024-01-09 DIAGNOSIS — E78.2 MIXED HYPERLIPIDEMIA: ICD-10-CM

## 2024-01-09 DIAGNOSIS — M54.50 CHRONIC LEFT-SIDED LOW BACK PAIN WITHOUT SCIATICA: ICD-10-CM

## 2024-01-09 DIAGNOSIS — E03.9 ACQUIRED HYPOTHYROIDISM: Primary | ICD-10-CM

## 2024-01-09 PROCEDURE — 99214 OFFICE O/P EST MOD 30 MIN: CPT | Performed by: FAMILY MEDICINE

## 2024-01-09 RX ORDER — CYCLOBENZAPRINE HCL 5 MG
5 TABLET ORAL 2 TIMES DAILY PRN
Qty: 60 TABLET | Refills: 3 | Status: SHIPPED | OUTPATIENT
Start: 2024-01-09

## 2024-01-09 RX ORDER — LEVOTHYROXINE SODIUM 0.12 MG/1
125 TABLET ORAL DAILY
Qty: 90 TABLET | Refills: 0
Start: 2024-01-09

## 2024-01-09 ASSESSMENT — ENCOUNTER SYMPTOMS
BACK PAIN: 1
SHORTNESS OF BREATH: 1
DIARRHEA: 0
VOMITING: 0
NAUSEA: 0

## 2024-01-09 NOTE — PROGRESS NOTES
Chief Complaint   Patient presents with    Back Pain     Left side  hursheryl, chiropractor not helping    Hypothyroidism       Hypothyroidism:  Patient is here today to follow up chronic hypothyroidism.  This is   generally controlled on current medication regimen--taking both 112 mcg and 125 mcg randomly because they are in the same bottle.  Takes medication as directed and tolerates well.  Symptoms from thyroid standpoint include none.  Most recent labs reviewed with patient, including CMP, CBC, TSH, and lipid panel, and are not remarkable.  Thyroid function in particular is  controlled.      Patient's past medical, surgical, social and/orfamily history reviewed, updated in chart, and are non-contributory (unless otherwise stated).  Medications and allergies also reviewed and updated in chart.      /80   Pulse 97   Resp 20   Ht 1.727 m (5' 8\")   Wt 78 kg (172 lb)   SpO2 97%   BMI 26.15 kg/m²     Review of Systems   Eyes:  Positive for visual disturbance (blurry vision).   Respiratory:  Positive for shortness of breath.    Cardiovascular:  Negative for chest pain, palpitations and leg swelling.   Gastrointestinal:  Negative for diarrhea, nausea and vomiting.   Genitourinary:  Negative for difficulty urinating, dysuria and frequency.   Musculoskeletal:  Positive for back pain.   Skin:  Negative for rash.   Psychiatric/Behavioral:  Negative for dysphoric mood.        Current Outpatient Medications   Medication Sig Dispense Refill    cyclobenzaprine (FLEXERIL) 5 MG tablet Take 1 tablet by mouth 2 times daily as needed for Muscle spasms 60 tablet 3    levothyroxine (SYNTHROID) 125 MCG tablet Take 1 tablet by mouth daily 90 tablet 0    montelukast (SINGULAIR) 10 MG tablet take 1 tablet by mouth nightly 90 tablet 1    ketoconazole (NIZORAL) 2 % shampoo Apply topically daily as needed.      omeprazole (PRILOSEC) 40 MG delayed release capsule Take 1 capsule by mouth nightly      mometasone-formoterol (DULERA)

## 2024-02-19 ENCOUNTER — HOSPITAL ENCOUNTER (EMERGENCY)
Age: 54
Discharge: HOME | End: 2024-02-19
Attending: EMERGENCY MEDICINE

## 2024-02-19 VITALS
SYSTOLIC BLOOD PRESSURE: 102 MMHG | DIASTOLIC BLOOD PRESSURE: 74 MMHG | OXYGEN SATURATION: 98 % | TEMPERATURE: 100.4 F | RESPIRATION RATE: 16 BRPM | HEART RATE: 86 BPM

## 2024-02-19 DIAGNOSIS — J11.1 INFLUENZA WITH RESPIRATORY MANIFESTATION OTHER THAN PNEUMONIA: Primary | ICD-10-CM

## 2024-02-19 DIAGNOSIS — J02.0 STREPTOCOCCAL SORE THROAT: ICD-10-CM

## 2024-02-19 PROCEDURE — 99283 EMERGENCY DEPT VISIT LOW MDM: CPT

## 2024-02-19 RX ORDER — DOXYCYCLINE HYCLATE 100 MG
100 TABLET ORAL 2 TIMES DAILY
Qty: 20 TABLET | Refills: 0 | Status: SHIPPED | OUTPATIENT
Start: 2024-02-19 | End: 2024-02-29

## 2024-02-19 RX ORDER — BROMPHENIRAMINE MALEATE, PSEUDOEPHEDRINE HYDROCHLORIDE, AND DEXTROMETHORPHAN HYDROBROMIDE 2; 30; 10 MG/5ML; MG/5ML; MG/5ML
5 SYRUP ORAL 4 TIMES DAILY PRN
Qty: 120 ML | Refills: 0 | Status: SHIPPED | OUTPATIENT
Start: 2024-02-19

## 2024-02-19 RX ORDER — OSELTAMIVIR PHOSPHATE 75 MG/1
75 CAPSULE ORAL 2 TIMES DAILY
Qty: 10 CAPSULE | Refills: 0 | Status: SHIPPED | OUTPATIENT
Start: 2024-02-19 | End: 2024-02-24

## 2024-02-19 ASSESSMENT — PAIN - FUNCTIONAL ASSESSMENT: PAIN_FUNCTIONAL_ASSESSMENT: 0-10

## 2024-02-19 ASSESSMENT — ENCOUNTER SYMPTOMS
BACK PAIN: 0
VOMITING: 0
SINUS PRESSURE: 0
EYE REDNESS: 0
COUGH: 1
EYE PAIN: 0
SORE THROAT: 0
EYE DISCHARGE: 0
WHEEZING: 0
RHINORRHEA: 1
ABDOMINAL PAIN: 0
NAUSEA: 0
SHORTNESS OF BREATH: 0
DIARRHEA: 0

## 2024-02-19 ASSESSMENT — PAIN DESCRIPTION - DESCRIPTORS: DESCRIPTORS: ACHING

## 2024-02-19 ASSESSMENT — PAIN SCALES - GENERAL: PAINLEVEL_OUTOF10: 6

## 2024-02-19 ASSESSMENT — PAIN DESCRIPTION - LOCATION: LOCATION: OTHER (COMMENT)

## 2024-02-19 NOTE — ED PROVIDER NOTES
EXPOSURE TO STREP AND INFLUENZA    The history is provided by the patient.   URI  Presenting symptoms: congestion, cough, fatigue, fever and rhinorrhea    Presenting symptoms: no ear pain and no sore throat    Severity:  Moderate  Onset quality:  Sudden  Duration:  2 days  Chronicity:  New  Associated symptoms: no arthralgias, no headaches and no wheezing         Review of Systems   Constitutional:  Positive for fatigue and fever. Negative for chills.   HENT:  Positive for congestion and rhinorrhea. Negative for ear pain, sinus pressure and sore throat.    Eyes:  Negative for pain, discharge and redness.   Respiratory:  Positive for cough. Negative for shortness of breath and wheezing.    Cardiovascular:  Negative for chest pain.   Gastrointestinal:  Negative for abdominal pain, diarrhea, nausea and vomiting.   Genitourinary:  Negative for dysuria and frequency.   Musculoskeletal:  Negative for arthralgias and back pain.   Skin:  Negative for rash and wound.   Neurological:  Negative for weakness and headaches.   Hematological:  Negative for adenopathy.   All other systems reviewed and are negative.       Physical Exam  Vitals and nursing note reviewed.   Constitutional:       Appearance: He is well-developed.   HENT:      Head: Normocephalic and atraumatic.      Jaw: No trismus.      Right Ear: Hearing and external ear normal. Tympanic membrane is retracted.      Left Ear: Hearing and external ear normal. Tympanic membrane is retracted.      Nose: Nose normal.      Right Sinus: No maxillary sinus tenderness or frontal sinus tenderness.      Left Sinus: No maxillary sinus tenderness or frontal sinus tenderness.      Mouth/Throat:      Pharynx: Uvula midline. No uvula swelling.   Eyes:      General: Lids are normal.      Conjunctiva/sclera: Conjunctivae normal.      Pupils: Pupils are equal, round, and reactive to light.   Cardiovascular:      Rate and Rhythm: Normal rate and regular rhythm.      Heart sounds:

## 2024-05-30 DIAGNOSIS — J44.89 ASTHMA WITH COPD (HCC): ICD-10-CM

## 2024-05-30 NOTE — TELEPHONE ENCOUNTER
Last seen 1/9/2024  Next appt 7/9/2024    Requested Prescriptions     Pending Prescriptions Disp Refills    montelukast (SINGULAIR) 10 MG tablet [Pharmacy Med Name: MONTELUKAST SOD 10 MG TABLET] 90 tablet 1     Sig: take 1 tablet by mouth nightly

## 2024-06-01 RX ORDER — MONTELUKAST SODIUM 10 MG/1
10 TABLET ORAL NIGHTLY
Qty: 90 TABLET | Refills: 1 | Status: SHIPPED | OUTPATIENT
Start: 2024-06-01

## 2024-07-10 ENCOUNTER — OFFICE VISIT (OUTPATIENT)
Dept: FAMILY MEDICINE CLINIC | Age: 54
End: 2024-07-10

## 2024-07-10 VITALS
DIASTOLIC BLOOD PRESSURE: 72 MMHG | RESPIRATION RATE: 20 BRPM | HEART RATE: 76 BPM | BODY MASS INDEX: 25.31 KG/M2 | HEIGHT: 68 IN | OXYGEN SATURATION: 96 % | WEIGHT: 167 LBS | SYSTOLIC BLOOD PRESSURE: 124 MMHG

## 2024-07-10 DIAGNOSIS — E03.9 ACQUIRED HYPOTHYROIDISM: Primary | ICD-10-CM

## 2024-07-10 DIAGNOSIS — Z12.5 PROSTATE CANCER SCREENING: ICD-10-CM

## 2024-07-10 PROCEDURE — 99214 OFFICE O/P EST MOD 30 MIN: CPT | Performed by: FAMILY MEDICINE

## 2024-07-10 RX ORDER — LEVOTHYROXINE SODIUM 0.12 MG/1
125 TABLET ORAL DAILY
Qty: 90 TABLET | Refills: 0 | Status: SHIPPED | OUTPATIENT
Start: 2024-07-10

## 2024-07-10 SDOH — ECONOMIC STABILITY: FOOD INSECURITY: WITHIN THE PAST 12 MONTHS, THE FOOD YOU BOUGHT JUST DIDN'T LAST AND YOU DIDN'T HAVE MONEY TO GET MORE.: NEVER TRUE

## 2024-07-10 SDOH — ECONOMIC STABILITY: HOUSING INSECURITY
IN THE LAST 12 MONTHS, WAS THERE A TIME WHEN YOU DID NOT HAVE A STEADY PLACE TO SLEEP OR SLEPT IN A SHELTER (INCLUDING NOW)?: NO

## 2024-07-10 SDOH — ECONOMIC STABILITY: FOOD INSECURITY: WITHIN THE PAST 12 MONTHS, YOU WORRIED THAT YOUR FOOD WOULD RUN OUT BEFORE YOU GOT MONEY TO BUY MORE.: NEVER TRUE

## 2024-07-10 SDOH — ECONOMIC STABILITY: INCOME INSECURITY: HOW HARD IS IT FOR YOU TO PAY FOR THE VERY BASICS LIKE FOOD, HOUSING, MEDICAL CARE, AND HEATING?: NOT HARD AT ALL

## 2024-07-10 ASSESSMENT — ENCOUNTER SYMPTOMS
NAUSEA: 0
DIARRHEA: 0
VOMITING: 0
SHORTNESS OF BREATH: 1

## 2024-07-10 NOTE — PROGRESS NOTES
Chief Complaint   Patient presents with    Hypothyroidism     Labs not done        Hypothyroidism:  Patient is here today to follow up chronic hypothyroidism.  This is   generally controlled on current medication regimen.  Takes medication as directed and tolerates well.  Symptoms from thyroidstandpoint include fatigue.   Thyroid function in particular is  controlled.      Patient's past medical, surgical, social and/orfamily history reviewed, updated in chart, and are non-contributory (unless otherwise stated).  Medications and allergies also reviewed and updated in chart.      /72   Pulse 76   Resp 20   Ht 1.727 m (5' 8\")   Wt 75.8 kg (167 lb)   SpO2 96%   BMI 25.39 kg/m²     Review of Systems   Eyes:  Positive for visual disturbance (blurry vision).   Respiratory:  Positive for shortness of breath (with exertion, due to COPD).    Cardiovascular:  Negative for chest pain, palpitations and leg swelling.   Gastrointestinal:  Negative for diarrhea, nausea and vomiting.   Genitourinary:  Negative for difficulty urinating, dysuria and frequency.   Skin:  Negative for rash.   Psychiatric/Behavioral:  Negative for dysphoric mood.        Current Outpatient Medications   Medication Sig Dispense Refill    levothyroxine (SYNTHROID) 125 MCG tablet Take 1 tablet by mouth daily 90 tablet 0    montelukast (SINGULAIR) 10 MG tablet take 1 tablet by mouth nightly 90 tablet 1    brompheniramine-pseudoephedrine-DM 2-30-10 MG/5ML syrup Take 5 mLs by mouth 4 times daily as needed for Congestion or Cough 120 mL 0    cyclobenzaprine (FLEXERIL) 5 MG tablet Take 1 tablet by mouth 2 times daily as needed for Muscle spasms 60 tablet 3    azelastine (ASTELIN) 0.1 % nasal spray 1-2 sprays by Nasal route 2 times daily as needed for Rhinitis Use in each nostril as directed 30 mL 1    ketoconazole (NIZORAL) 2 % shampoo Apply topically daily as needed.      omeprazole (PRILOSEC) 40 MG delayed release capsule Take 1 capsule by mouth

## 2024-11-11 LAB
ALBUMIN: 4.3 G/DL (ref 3.5–5.2)
ALP BLD-CCNC: 82 U/L (ref 40–129)
ALT SERPL-CCNC: 8 U/L (ref 0–40)
ANION GAP SERPL CALCULATED.3IONS-SCNC: 12 MMOL/L (ref 7–16)
AST SERPL-CCNC: 12 U/L (ref 0–39)
BILIRUB SERPL-MCNC: 0.3 MG/DL (ref 0–1.2)
BUN BLDV-MCNC: 22 MG/DL (ref 6–20)
CALCIUM SERPL-MCNC: 9.4 MG/DL (ref 8.6–10.2)
CHLORIDE BLD-SCNC: 105 MMOL/L (ref 98–107)
CHOLESTEROL, TOTAL: 206 MG/DL
CO2: 26 MMOL/L (ref 22–29)
CREAT SERPL-MCNC: 1.1 MG/DL (ref 0.7–1.2)
GFR, ESTIMATED: 82 ML/MIN/1.73M2
GLUCOSE BLD-MCNC: 103 MG/DL (ref 74–99)
HCT VFR BLD CALC: 47.4 % (ref 37–54)
HDLC SERPL-MCNC: 36 MG/DL
HEMOGLOBIN: 15.4 G/DL (ref 12.5–16.5)
LDL CHOLESTEROL: 151 MG/DL
MCH RBC QN AUTO: 29.9 PG (ref 26–35)
MCHC RBC AUTO-ENTMCNC: 32.5 G/DL (ref 32–34.5)
MCV RBC AUTO: 92 FL (ref 80–99.9)
PDW BLD-RTO: 12.9 % (ref 11.5–15)
PLATELET # BLD: 266 K/UL (ref 130–450)
PMV BLD AUTO: 10.8 FL (ref 7–12)
POTASSIUM SERPL-SCNC: 4.4 MMOL/L (ref 3.5–5)
PROSTATE SPECIFIC ANTIGEN: 0.21 NG/ML (ref 0–4)
RBC # BLD: 5.15 M/UL (ref 3.8–5.8)
SODIUM BLD-SCNC: 143 MMOL/L (ref 132–146)
TOTAL PROTEIN: 6.5 G/DL (ref 6.4–8.3)
TRIGL SERPL-MCNC: 95 MG/DL
TSH SERPL DL<=0.05 MIU/L-ACNC: 2.13 UIU/ML (ref 0.27–4.2)
VLDLC SERPL CALC-MCNC: 19 MG/DL
WBC # BLD: 6.9 K/UL (ref 4.5–11.5)

## 2024-11-14 DIAGNOSIS — E03.9 ACQUIRED HYPOTHYROIDISM: ICD-10-CM

## 2024-11-14 NOTE — TELEPHONE ENCOUNTER
Patient's wife, Jacquelyn, called for a refill.    Name of Medication(s) Requested:  Requested Prescriptions     Pending Prescriptions Disp Refills    levothyroxine (SYNTHROID) 125 MCG tablet 90 tablet 0     Sig: Take 1 tablet by mouth daily       Medication is on current medication list Yes    Dosage and directions were verified? Yes    Quantity verified: 90 day supply     Pharmacy Verified?  Yes    Last Appointment:  7/10/2024    Future appts:  Future Appointments   Date Time Provider Department Center   1/13/2025  9:00 AM Cha Torres MD [facility]        (If no appt send self scheduling link. .REFILLAPPT)  Scheduling request sent?     [] Yes  [x] No    Does patient need updated?  [] Yes  [x] No

## 2024-11-15 DIAGNOSIS — E03.9 ACQUIRED HYPOTHYROIDISM: ICD-10-CM

## 2024-11-15 NOTE — TELEPHONE ENCOUNTER
Name of Medication(s) Requested:  Requested Prescriptions     Pending Prescriptions Disp Refills    omeprazole (PRILOSEC) 40 MG delayed release capsule [Pharmacy Med Name: OMEPRAZOLE 40MG CAPSULES] 30 capsule      Sig: TAKE 1 CAPSULE BY MOUTH AT BEDTIME    levothyroxine (SYNTHROID) 125 MCG tablet [Pharmacy Med Name: LEVOTHYROXINE 0.125MG (125MCG) TAB] 90 tablet 0     Sig: TAKE 1 TABLET BY MOUTH ONCE DAILY       Medication is on current medication list Yes    Dosage and directions were verified? Yes    Quantity verified: 90 day supply     Pharmacy Verified?  Yes    Last Appointment:  7/10/2024    Future appts:  Future Appointments   Date Time Provider Department Center   1/13/2025  9:00 AM Cha Torres MD [facility] DE[facility]        (If no appt send self scheduling link. .REFILLAPPT)  Scheduling request sent?     [] Yes  [x] No    Does patient need updated?  [] Yes  [x] No

## 2024-11-17 RX ORDER — OMEPRAZOLE 40 MG/1
40 CAPSULE, DELAYED RELEASE ORAL NIGHTLY
Qty: 90 CAPSULE | Refills: 0 | Status: SHIPPED | OUTPATIENT
Start: 2024-11-17

## 2024-11-17 RX ORDER — LEVOTHYROXINE SODIUM 125 UG/1
125 TABLET ORAL DAILY
Qty: 90 TABLET | Refills: 0 | OUTPATIENT
Start: 2024-11-17

## 2024-11-17 RX ORDER — LEVOTHYROXINE SODIUM 125 UG/1
125 TABLET ORAL DAILY
Qty: 90 TABLET | Refills: 0 | Status: SHIPPED | OUTPATIENT
Start: 2024-11-17

## 2025-01-08 ENCOUNTER — OFFICE VISIT (OUTPATIENT)
Dept: FAMILY MEDICINE CLINIC | Age: 55
End: 2025-01-08

## 2025-01-08 VITALS
BODY MASS INDEX: 24.1 KG/M2 | HEIGHT: 68 IN | RESPIRATION RATE: 16 BRPM | OXYGEN SATURATION: 93 % | WEIGHT: 159 LBS | SYSTOLIC BLOOD PRESSURE: 116 MMHG | HEART RATE: 73 BPM | TEMPERATURE: 97.5 F | DIASTOLIC BLOOD PRESSURE: 84 MMHG

## 2025-01-08 DIAGNOSIS — J44.89 ASTHMA WITH COPD (HCC): ICD-10-CM

## 2025-01-08 DIAGNOSIS — E03.9 ACQUIRED HYPOTHYROIDISM: ICD-10-CM

## 2025-01-08 DIAGNOSIS — K04.7 DENTAL INFECTION: Primary | ICD-10-CM

## 2025-01-08 PROCEDURE — 99214 OFFICE O/P EST MOD 30 MIN: CPT | Performed by: FAMILY MEDICINE

## 2025-01-08 RX ORDER — OMEPRAZOLE 40 MG/1
40 CAPSULE, DELAYED RELEASE ORAL NIGHTLY
Qty: 90 CAPSULE | Refills: 1 | Status: SHIPPED | OUTPATIENT
Start: 2025-01-08

## 2025-01-08 RX ORDER — SULFAMETHOXAZOLE AND TRIMETHOPRIM 800; 160 MG/1; MG/1
1 TABLET ORAL 2 TIMES DAILY
Qty: 20 TABLET | Refills: 0 | Status: SHIPPED | OUTPATIENT
Start: 2025-01-08 | End: 2025-01-18

## 2025-01-08 RX ORDER — MONTELUKAST SODIUM 10 MG/1
10 TABLET ORAL NIGHTLY
Qty: 90 TABLET | Refills: 1 | Status: SHIPPED | OUTPATIENT
Start: 2025-01-08

## 2025-01-08 RX ORDER — LEVOTHYROXINE SODIUM 125 UG/1
125 TABLET ORAL DAILY
Qty: 90 TABLET | Refills: 1 | Status: SHIPPED | OUTPATIENT
Start: 2025-01-08

## 2025-01-08 SDOH — ECONOMIC STABILITY: FOOD INSECURITY: WITHIN THE PAST 12 MONTHS, YOU WORRIED THAT YOUR FOOD WOULD RUN OUT BEFORE YOU GOT MONEY TO BUY MORE.: NEVER TRUE

## 2025-01-08 SDOH — ECONOMIC STABILITY: FOOD INSECURITY: WITHIN THE PAST 12 MONTHS, THE FOOD YOU BOUGHT JUST DIDN'T LAST AND YOU DIDN'T HAVE MONEY TO GET MORE.: NEVER TRUE

## 2025-01-08 ASSESSMENT — PATIENT HEALTH QUESTIONNAIRE - PHQ9
SUM OF ALL RESPONSES TO PHQ QUESTIONS 1-9: 0
2. FEELING DOWN, DEPRESSED OR HOPELESS: NOT AT ALL
1. LITTLE INTEREST OR PLEASURE IN DOING THINGS: NOT AT ALL
SUM OF ALL RESPONSES TO PHQ9 QUESTIONS 1 & 2: 0
SUM OF ALL RESPONSES TO PHQ QUESTIONS 1-9: 0

## 2025-01-08 NOTE — PROGRESS NOTES
[address], [address]   [place] [address]   [phone]   Cha Torres MD       Patient: Jaret Bender  Dateof Birth: 1970  Visit Date: 1/8/25    Jaret is a 54 y.o. year old male here today for   Chief Complaint   Patient presents with    Dental Pain     Pt states he's been having tooth ache for the last couple of days.        HPI  History of Present Illness  The patient is a 54-year-old male who presents for evaluation of dental pain.    He has been experiencing persistent pain in his left upper molar, which was previously identified as decayed and recommended for extraction a year ago. The pain, described as shooting and intense, has escalated over the past two days, making it difficult for him to perform basic oral functions such as spitting or applying pressure to the tooth. He reports a foul taste in his mouth, indicative of an abscess. He also reports chills and severe headaches localized to the left temple, which are triggered by biting down on the affected tooth. He has been managing the pain with an ice pack and Tylenol. He has been exposed to significant amounts of dust due to recent home renovations, leading to symptoms of sneezing, runny nose, and watery eyes. He has been experiencing sensitivity to cold drinks for the past few months. He also reports the presence of yellowish mucus in his mouth and nasal discharge. He has no recent dental care and has not returned to his previous dentist due to dissatisfaction with the treatment plan and costs. He has no fever, sore throat, sinus pressure, cough, or chest tightness. His wisdom teeth have been removed in the past.    He is currently on levothyroxine 125 mcg for thyroid management and requires a refill of this medication.    He is also requesting refills for omeprazole and Singulair.    ALLERGIES  The patient is allergic to PENICILLIN and ERYTHROMYCIN.    MEDICATIONS  Current: Levothyroxine,

## 2025-02-03 DIAGNOSIS — J44.89 ASTHMA WITH COPD (HCC): ICD-10-CM

## 2025-02-03 RX ORDER — MONTELUKAST SODIUM 10 MG/1
10 TABLET ORAL NIGHTLY
Qty: 90 TABLET | Refills: 1 | Status: SHIPPED | OUTPATIENT
Start: 2025-02-03

## 2025-02-03 NOTE — TELEPHONE ENCOUNTER
Last seen 1/8/2025  Next appt 7/8/2025    Requested Prescriptions     Pending Prescriptions Disp Refills    montelukast (SINGULAIR) 10 MG tablet [Pharmacy Med Name: MONTELUKAST 10MG TABLETS] 90 tablet 1     Sig: TAKE 1 TABLET BY MOUTH NIGHTLY

## 2025-05-30 ENCOUNTER — HOSPITAL ENCOUNTER (EMERGENCY)
Age: 55
Discharge: HOME | End: 2025-05-30

## 2025-05-30 VITALS
TEMPERATURE: 97.4 F | HEART RATE: 77 BPM | OXYGEN SATURATION: 96 % | BODY MASS INDEX: 25.09 KG/M2 | RESPIRATION RATE: 20 BRPM | WEIGHT: 165 LBS | DIASTOLIC BLOOD PRESSURE: 88 MMHG | SYSTOLIC BLOOD PRESSURE: 139 MMHG

## 2025-05-30 DIAGNOSIS — S61.211A LACERATION OF LEFT INDEX FINGER WITHOUT FOREIGN BODY WITHOUT DAMAGE TO NAIL, INITIAL ENCOUNTER: Primary | ICD-10-CM

## 2025-05-30 PROCEDURE — 90715 TDAP VACCINE 7 YRS/> IM: CPT | Performed by: PHYSICIAN ASSISTANT

## 2025-05-30 PROCEDURE — 99211 OFF/OP EST MAY X REQ PHY/QHP: CPT

## 2025-05-30 PROCEDURE — 12001 RPR S/N/AX/GEN/TRNK 2.5CM/<: CPT

## 2025-05-30 PROCEDURE — 90471 IMMUNIZATION ADMIN: CPT | Performed by: PHYSICIAN ASSISTANT

## 2025-05-30 PROCEDURE — 6360000002 HC RX W HCPCS: Performed by: PHYSICIAN ASSISTANT

## 2025-05-30 RX ORDER — CLINDAMYCIN HYDROCHLORIDE 300 MG/1
300 CAPSULE ORAL 3 TIMES DAILY
Qty: 21 CAPSULE | Refills: 0 | Status: SHIPPED | OUTPATIENT
Start: 2025-05-30 | End: 2025-06-06

## 2025-05-30 RX ORDER — LIDOCAINE HYDROCHLORIDE 10 MG/ML
5 INJECTION, SOLUTION INFILTRATION; PERINEURAL ONCE
Status: COMPLETED | OUTPATIENT
Start: 2025-05-30 | End: 2025-05-30

## 2025-05-30 RX ADMIN — TETANUS TOXOID, REDUCED DIPHTHERIA TOXOID AND ACELLULAR PERTUSSIS VACCINE, ADSORBED 0.5 ML: 5; 2.5; 8; 8; 2.5 SUSPENSION INTRAMUSCULAR at 13:54

## 2025-05-30 RX ADMIN — LIDOCAINE HYDROCHLORIDE 5 ML: 10 INJECTION, SOLUTION INFILTRATION; PERINEURAL at 13:54

## 2025-05-30 ASSESSMENT — PAIN SCALES - GENERAL: PAINLEVEL_OUTOF10: 2

## 2025-05-30 ASSESSMENT — PAIN - FUNCTIONAL ASSESSMENT: PAIN_FUNCTIONAL_ASSESSMENT: 0-10

## 2025-05-30 NOTE — ED PROVIDER NOTES
[facility]     NAME: Jaret Bender  :  1970  MRN:  [id number]  Date of evaluation: 2025  Provider: Berny Mcclain PA-C  PCP: Cha Torres MD  Note Started : 1:18 PM EDT 25    Chief Complaint: Laceration (Pt was working on bleachers and cut his left 2nd digit. )      This is a 55 year old male that presents to  with a complaint of a laceration to the left index finger that occurred today while dismantling bleachers at an old [place]. He denies numbness or tingling or bony pain. Unsure of last Td vaccine. On first contact with patient, the patient is in no acute distress.         Review of Systems  Pertinent positives and negatives are stated within HPI, all other systems reviewed and are negative.     Allergies: Erythromycin and Penicillins     --------------------------------------------- PAST HISTORY ---------------------------------------------  Past Medical History:  has a past medical history of Asthma, COPD (chronic obstructive pulmonary disease) (HCC), Emphysema (subcutaneous) (surgical) resulting from a procedure, Emphysema lung (HCC), Hypothyroidism, Sleep apnea, and Umbilical hernia.    Past Surgical History:  has a past surgical history that includes Appendectomy; Leg Surgery; Umbilical hernia repair (N/A, 2020); and Knee arthroscopy (Right, 2021).    Social History:  reports that he has been smoking cigarettes. He has a 36 pack-year smoking history. He uses smokeless tobacco. He reports current alcohol use. He reports that he does not use drugs.    Family History: family history includes Cancer in his father and maternal grandfather; Heart Attack in his maternal grandmother and paternal grandfather; Heart Disease in his maternal grandfather; Heart Failure in his maternal grandmother and paternal grandmother; Hypothyroidism in his mother and sister.     The patient’s home medications have been reviewed.    The nursing  oriented to person, place, and time.      GCS: GCS eye subscore is 4. GCS verbal subscore is 5. GCS motor subscore is 6.         -------------------------------------------------- RESULTS -------------------------------------------------  No results found for this visit on 05/30/25.  No orders to display       Labs Reviewed - No data to display    When ordered only abnormal lab results are displayed. All other labs were within normal range or not returned as of this dictation.    Interpretation per the Radiologist below, if available at the time of this note:    No orders to display     No results found.    No results found.     -------------------------------------------------PROCEDURES--------------------------------------------  Unless otherwise noted below, none      Lac Repair    Date/Time: 5/30/2025 1:30 PM    Performed by: Berny Mcclain PA-C  Authorized by: Berny Mcclain PA-C    Consent:     Consent obtained:  Verbal    Consent given by:  Patient    Risks, benefits, and alternatives were discussed: yes      Risks discussed:  Infection    Alternatives discussed:  No treatment  Universal protocol:     Procedure explained and questions answered to patient or proxy's satisfaction: yes      Patient identity confirmed:  Verbally with patient  Anesthesia:     Anesthesia method:  Local infiltration    Local anesthetic:  Lidocaine 1% w/o epi (digital block)  Laceration details:     Location:  Finger    Finger location:  L index finger    Length (cm):  4.5    Depth (mm):  3  Pre-procedure details:     Preparation:  Patient was prepped and draped in usual sterile fashion  Exploration:     Wound exploration: wound explored through full range of motion and entire depth of wound visualized      Wound extent: no areolar tissue violation noted, no fascia violation noted, no foreign bodies/material noted, no muscle damage noted, no nerve damage noted, no tendon damage noted, no underlying fracture noted and no vascular

## 2025-05-30 NOTE — DISCHARGE INSTRUCTIONS
10 sutures out in 10-12 days  Keep clean   Use antibiotic ointment and bandage daily until healed.

## 2025-05-30 NOTE — ED NOTES
Wound to left 2nd finger cleansed, FERDINAND, splint and dry dressing applied. Wound care explained to patient, voiced understanding.       Inez Huffman LPN  05/30/25 0107

## 2025-05-30 NOTE — DISCHARGE INSTR - COC
Continuity of Care Form    Patient Name: Jaret Bender   :  1970  MRN:  [id number]    Admit date:  2025  Discharge date:  ***    Code Status Order: Prior   Advance Directives:     Admitting Physician:  No admitting provider for patient encounter.  PCP: Cha Torres MD    Discharging Nurse: ***  Discharging Hospital Unit/Room#:   Discharging Unit Phone Number: ***    Emergency Contact:   Extended Emergency Contact Information  Primary Emergency Contact: HERBIE BENDER  Address: [address]  Home Phone: [phone]  Mobile Phone: [phone]  Relation: Spouse  Secondary Emergency Contact: Zeenat Bender  Mobile Phone: [phone]  Relation: Parent    Past Surgical History:  Past Surgical History:   Procedure Laterality Date    APPENDECTOMY      KNEE ARTHROSCOPY Right 2021    ARTHROSCOPIC EXAM AND TREATMENT RIGHT KNEE performed by JAMES Pratt DO at [facility] OR    LEG SURGERY      i-d staph infection leg  years ago    UMBILICAL HERNIA REPAIR N/A 2020    OPEN UMBILICAL HERNIA  REPAIR performed by Rob Chase MD at [facility] OR       Immunization History:   Immunization History   Administered Date(s) Administered    TDaP, ADACEL (age 10y-64y), BOOSTRIX (age 10y+), IM, 0.5mL 2025       Active Problems:  Patient Active Problem List   Diagnosis Code    Umbilical hernia without obstruction and without gangrene K42.9    Asthma with COPD ([facility]) J44.89    Acquired hypothyroidism E03.9    Sciatica of left side M54.32    Gastroesophageal reflux disease with esophagitis without hemorrhage K21.00    Tear of medial meniscus of right knee, current S83.241A    Synovitis of right knee M65.961    Plica of knee, right M67.51       Isolation/Infection:   Isolation            No Isolation          Patient Infection Status    None to display              Nurse Assessment:  Last Vital Signs: /88   Pulse 77

## 2025-06-26 DIAGNOSIS — J44.89 ASTHMA WITH COPD (HCC): ICD-10-CM

## 2025-06-26 NOTE — TELEPHONE ENCOUNTER
Name of Medication(s) Requested:  Requested Prescriptions     Pending Prescriptions Disp Refills    montelukast (SINGULAIR) 10 MG tablet 90 tablet 1     Sig: Take 1 tablet by mouth nightly       Medication is on current medication list Yes    Dosage and directions were verified? Yes    Quantity verified: 90 day supply     Pharmacy Verified?  Yes    Last Appointment:  1/8/2025    Future appts:  Future Appointments   Date Time Provider Department Center   7/8/2025  9:15 AM Cha Torres MD [facility] DE[facility]        (If no appt send self scheduling link. .REFILLAPPT)  Scheduling request sent?     [] Yes  [x] No    Does patient need updated?  [] Yes  [x] No

## 2025-06-27 RX ORDER — MONTELUKAST SODIUM 10 MG/1
10 TABLET ORAL NIGHTLY
Qty: 90 TABLET | Refills: 1 | Status: SHIPPED | OUTPATIENT
Start: 2025-06-27

## 2025-07-07 DIAGNOSIS — E03.9 ACQUIRED HYPOTHYROIDISM: ICD-10-CM

## 2025-07-09 RX ORDER — OMEPRAZOLE 40 MG/1
40 CAPSULE, DELAYED RELEASE ORAL NIGHTLY
Qty: 30 CAPSULE | Refills: 0 | Status: SHIPPED | OUTPATIENT
Start: 2025-07-09

## 2025-07-09 RX ORDER — LEVOTHYROXINE SODIUM 125 UG/1
125 TABLET ORAL DAILY
Qty: 30 TABLET | Refills: 0 | Status: SHIPPED | OUTPATIENT
Start: 2025-07-09

## 2025-07-15 ENCOUNTER — OFFICE VISIT (OUTPATIENT)
Dept: FAMILY MEDICINE CLINIC | Age: 55
End: 2025-07-15

## 2025-07-15 VITALS
WEIGHT: 160 LBS | SYSTOLIC BLOOD PRESSURE: 118 MMHG | BODY MASS INDEX: 25.11 KG/M2 | DIASTOLIC BLOOD PRESSURE: 64 MMHG | RESPIRATION RATE: 20 BRPM | HEIGHT: 67 IN | HEART RATE: 60 BPM | OXYGEN SATURATION: 96 %

## 2025-07-15 DIAGNOSIS — E55.9 VITAMIN D DEFICIENCY: ICD-10-CM

## 2025-07-15 DIAGNOSIS — E03.9 ACQUIRED HYPOTHYROIDISM: Primary | ICD-10-CM

## 2025-07-15 PROCEDURE — 99214 OFFICE O/P EST MOD 30 MIN: CPT | Performed by: FAMILY MEDICINE

## 2025-07-15 RX ORDER — LEVOTHYROXINE SODIUM 125 UG/1
125 TABLET ORAL DAILY
Qty: 90 TABLET | Refills: 1 | Status: SHIPPED | OUTPATIENT
Start: 2025-07-15

## 2025-07-15 RX ORDER — OMEPRAZOLE 40 MG/1
40 CAPSULE, DELAYED RELEASE ORAL NIGHTLY
Qty: 90 CAPSULE | Refills: 1 | Status: SHIPPED | OUTPATIENT
Start: 2025-07-15

## 2025-07-15 NOTE — PROGRESS NOTES
[address], [address]   [place] [address]   [phone]   Cha Torres MD     Patient: Jaret Bender  Dateof Birth: 1970  Visit Date: 7/15/25    Jaret is a 55 y.o. year old male here today for   Chief Complaint   Patient presents with    Hypothyroidism     Discuss vitamin D         HPI  History of Present Illness  The patient presents for a follow-up on hypothyroidism and vitamin D deficiency.    He reports no changes in hair, nails, or skin. He is not experiencing any nausea, vomiting, diarrhea, or pain during urination. However, he has been feeling fatigued and has noticed a significant drop in his oxygen levels on a few occasions over the past two months. This has led to instances where he falls asleep upon returning home. 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    He was previously diagnosed with low vitamin D levels and was prescribed vitamin D supplements. He discontinued milk consumption due to it causing choking sensations at night. He only refilled his prescription once and has not had his vitamin D levels checked since then.    He is unsure if these episodes are asthma attacks. He has been using Dulera and Flovent inhalers but is uncertain about their expiration dates as he has not purchased new ones due to lack of insurance.    He also mentions that his vision is gradually deteriorating. He got new glasses a few months ago and was informed about the possibility of refractive lens replacement surgery, which would cost around $11,000 to $12,000. He currently wears prescription glasses.    Social History:  Diet: He discontinued milk consumption due to choking sensations at night.  Coffee/Tea/Caffeine-containing Drinks: He drinks coffee.    PAST SURGICAL HISTORY:  10 stitches on finger from injury on 05/30/2025.      Recent lab results were reviewed, including CMP, CBC,

## 2025-08-09 DIAGNOSIS — J44.89 ASTHMA WITH COPD (HCC): ICD-10-CM

## 2025-08-12 RX ORDER — MONTELUKAST SODIUM 10 MG/1
10 TABLET ORAL NIGHTLY
Qty: 90 TABLET | Refills: 1 | Status: SHIPPED | OUTPATIENT
Start: 2025-08-12

## (undated) DEVICE — SYRINGE MED 10ML TRNSLUC BRL PLUNG BLK MRK POLYPR CTRL

## (undated) DEVICE — STERILE VELCLOSE ELASTIC BANDAGE, 4IN X 10 YARDS: Brand: VELCLOSE

## (undated) DEVICE — ENCORE® LATEX TEXTURED SIZE 8, STERILE LATEX POWDER-FREE SURGICAL GLOVE: Brand: ENCORE

## (undated) DEVICE — 1810 FOAM BLOCK NEEDLE COUNTER: Brand: DEVON

## (undated) DEVICE — MITT PREP W575XL775IN POVIDONE IOD HAIR REMV

## (undated) DEVICE — DRESSING GZ XRFRM 4X4(25/BX 6BX/CS)

## (undated) DEVICE — NEEDLE HYPO 25GA L1.5IN BLU POLYPR HUB S STL REG BVL STR

## (undated) DEVICE — 3M™ STERI-STRIP™ REINFORCED ADHESIVE SKIN CLOSURES, R1541, 1/4 IN X 3 IN (6 MM X 75 MM), 3 STRIPS/ENVELOPE: Brand: 3M™ STERI-STRIP™

## (undated) DEVICE — [HIGH FLOW INSUFFLATOR,  DO NOT USE IF PACKAGE IS DAMAGED,  KEEP DRY,  KEEP AWAY FROM SUNLIGHT,  PROTECT FROM HEAT AND RADIOACTIVE SOURCES.]: Brand: PNEUMOSURE

## (undated) DEVICE — GOWN SURG XL LNG LEN SPUNBOND REINF VELC TIE LEV 4 IMPERV

## (undated) DEVICE — GLOVE ORANGE PI 7 1/2   MSG9075

## (undated) DEVICE — BANDAGE COMPR W6XL12FT SGL LAYERED NO CLSR EXSANGUATION

## (undated) DEVICE — GLOVE SURG 7 LTX TRIFLEX WIDE FINGER PWDR

## (undated) DEVICE — DOUBLE BASIN SET: Brand: MEDLINE INDUSTRIES, INC.

## (undated) DEVICE — GAUZE,SPONGE,4"X4",16PLY,STRL,LF,10/TRAY: Brand: MEDLINE

## (undated) DEVICE — GAUZE,SPONGE,4"X4",16PLY,XRAY,STRL,LF: Brand: MEDLINE

## (undated) DEVICE — GAUZE,SPONGE,2"X2",8PLY,STERILE,LF,2'S: Brand: MEDLINE

## (undated) DEVICE — TROCAR: Brand: KII SLEEVE

## (undated) DEVICE — TOWEL,OR,DSP,ST,BLUE,STD,6/PK,12PK/CS: Brand: MEDLINE

## (undated) DEVICE — PEN: MARKING STD 100/CS: Brand: MEDICAL ACTION INDUSTRIES

## (undated) DEVICE — MARKER,SKIN,WI/RULER AND LABELS: Brand: MEDLINE

## (undated) DEVICE — SMARTGOWN BREATHABLE SPECIALTY GOWN: Brand: CONVERTORS

## (undated) DEVICE — COTTON ROLL,1 LB: Brand: CURITY

## (undated) DEVICE — Y-TYPE TUR IRRIGATION SET

## (undated) DEVICE — BLADE SHV L13CM DIA4MM TAPR TIP SCIS LIKE CUT OVL OUTER

## (undated) DEVICE — GARMENT,MEDLINE,DVT,INT,CALF,MED, GEN2: Brand: MEDLINE

## (undated) DEVICE — COVER,LIGHT HANDLE,FLX,1/PK: Brand: MEDLINE INDUSTRIES, INC.

## (undated) DEVICE — BLADE SHV L13CM DIA4MM CRV TAPR TIP LD TORPEDO COOLCUT

## (undated) DEVICE — PACK SURG LAP CHOLE CUSTOM

## (undated) DEVICE — PLUMEPORT LAPAROSCOPIC SMOKE FILTRATION DEVICE: Brand: PLUMEPORT ACTIV

## (undated) DEVICE — 3M™ COBAN™ SELF-ADHERENT WRAP, 1586S, STERILE, 6 IN X 5 YD (15 CM X 4,5 M), 12 ROLLS/CASE: Brand: 3M™ COBAN™

## (undated) DEVICE — SOLUTION IV IRRIG POUR BRL 0.9% SODIUM CHL 2F7124

## (undated) DEVICE — ELECTRODE PT RET AD L9FT HI MOIST COND ADH HYDRGEL CORDED

## (undated) DEVICE — APPLICATOR MEDICATED 26 CC SOLUTION HI LT ORNG CHLORAPREP

## (undated) DEVICE — GOWN,SIRUS,NONRNF,SETINSLV,XL,20/CS: Brand: MEDLINE

## (undated) DEVICE — BANDAGE ADH DIA7/8IN PLAS SPOT COMFORTABLE CURAD

## (undated) DEVICE — BLADE CLIPPER GEN PURP NS

## (undated) DEVICE — TROCAR: Brand: KII FIOS FIRST ENTRY

## (undated) DEVICE — MEDI-VAC NON-CONDUCTIVE SUCTION TUBING: Brand: CARDINAL HEALTH

## (undated) DEVICE — STANDARD HYPODERMIC NEEDLE,POLYPROPYLENE HUB: Brand: MONOJECT

## (undated) DEVICE — INTENDED FOR TISSUE SEPARATION, AND OTHER PROCEDURES THAT REQUIRE A SHARP SURGICAL BLADE TO PUNCTURE OR CUT.: Brand: BARD-PARKER ® STAINLESS STEEL BLADES

## (undated) DEVICE — PADDING CAST W4INXL4YD NONSTERILE COT COHESIVE HND TEARABLE

## (undated) DEVICE — NEEDLE HYPO 18GA L1.5IN PNK POLYPR HUB S STL THN WALL FILL

## (undated) DEVICE — PACK PROC ORTH LO EXT IX CUST

## (undated) DEVICE — HALF SHEET: Brand: CONVERTORS

## (undated) DEVICE — SOLUTION SURG PREP POV IOD 7.5% 4 OZ

## (undated) DEVICE — SET MAJOR INSTR HOUSE

## (undated) DEVICE — 12 ML SYRINGE,LUER-LOCK TIP: Brand: MONOJECT